# Patient Record
Sex: FEMALE | Race: WHITE | NOT HISPANIC OR LATINO | Employment: FULL TIME | URBAN - NONMETROPOLITAN AREA
[De-identification: names, ages, dates, MRNs, and addresses within clinical notes are randomized per-mention and may not be internally consistent; named-entity substitution may affect disease eponyms.]

---

## 2020-04-13 ENCOUNTER — TELEMEDICINE (OUTPATIENT)
Dept: SURGERY | Facility: CLINIC | Age: 40
End: 2020-04-13

## 2020-04-13 DIAGNOSIS — Z86.010 HISTORY OF COLON POLYPS: ICD-10-CM

## 2020-04-13 DIAGNOSIS — R13.19 OTHER DYSPHAGIA: Primary | ICD-10-CM

## 2020-04-13 DIAGNOSIS — Z72.0 TOBACCO ABUSE: ICD-10-CM

## 2020-04-13 DIAGNOSIS — Z12.11 COLON CANCER SCREENING: ICD-10-CM

## 2020-04-13 PROCEDURE — 99213 OFFICE O/P EST LOW 20 MIN: CPT | Performed by: SURGERY

## 2020-04-13 NOTE — PROGRESS NOTES
General Surgery      Patient Care Team:  Ruth Aguilar as PCP - General (Nurse Practitioner)    CHIEF COMPLAINT: dysphagia    HISTORY OF PRESENT ILLNESS:    This very pleasant patient is having issues with swallowing.  She feels like the issue is in the middle of her throat.  She had a right thyroidectomy in 2008.  She has had ultrasounds since that time and has had no further issues.  She also has a history of colon polyps.  Her last colonoscopy was 4 years ago.  She has no issues with abdominal pain or constipation.  Her only complaint is difficulty swallowing.  She feels like food or liquid will not go down and that she will choke.  She has no masses in her neck but when she pushes on her surgical scar she can feel it higher in her throat.  She does smoke cigarettes.  She takes Flonase occasionally but takes no other medications.  She has no other complaints.      Past Medical History:   Diagnosis Date   • Back pain    • Constipation    • Hypothyroidism      Past Surgical History:   Procedure Laterality Date   • THYROIDECTOMY      R   • WISDOM TOOTH EXTRACTION       Family History   Problem Relation Age of Onset   • Stomach cancer Maternal Grandmother    • Skin cancer Maternal Grandmother    • Throat cancer Maternal Grandfather    • Lung cancer Maternal Grandfather      Social History     Tobacco Use   • Smoking status: Current Every Day Smoker     Types: Cigarettes   • Smokeless tobacco: Never Used   Substance Use Topics   • Alcohol use: Yes     Comment: RARELY   • Drug use: Never       (Not in a hospital admission)  Allergies:  Patient has no known allergies.    REVIEW OF SYSTEMS:  Please see the above history of present illness for pertinent positives and negatives.  The remainder of the patient's systems have been reviewed and are negative.     Vital Signs  BP: ()/()   Arterial Line BP: ()/()          Physical Exam:  Physical Exam   Constitutional: Patient appears well-developed and well-nourished and in no  acute distress   HEENT:   Head: Normocephalic and atraumatic.   Mouth and Throat: Patient has moist mucous membranes. Oropharynx is clear of any erythema or exudate.     Neck:  No JVD present. No thyromegaly present.   Musculoskeletal: Normal posture.  Neurological: Patient is alert and oriented.  Psychological:   Mood and behavior appropriate.  Skin: Skin is warm and dry.    Debilities/Disabilities Identified: None    Emotional Behavior: Appropriate           Results Review:    I reviewed the patient's new clinical results.  Lab Results (most recent)     None          Imaging Results (Most Recent)     None            ECG/EMG Results (most recent)     None            Assessment/Plan     Dysphagia  History of colon polyps  Colon cancer screening    --we will get Randall scheduled for a swallow study.  We will also get her scheduled for an EGD and colonoscopy.     I discussed with the patient the benefits and risks of performing endoscopy.  Benefits and risks were not limited to but including bleeding, infection, perforation, complications of anesthesia, aspiration.  The patient appeared to understand and is willing to proceed.    Thank you for allowing me to participate in the care of this interesting patient.    Karlee Rao DO  04/13/20  10:16

## 2020-04-13 NOTE — PROGRESS NOTES
This note is a blank note.  Randall had 2 visits scheduled at the same time due to issues connecting to tele-health.

## 2020-04-16 ENCOUNTER — HOSPITAL ENCOUNTER (OUTPATIENT)
Dept: GENERAL RADIOLOGY | Facility: HOSPITAL | Age: 40
Discharge: HOME | End: 2020-04-16
Admitting: SURGERY

## 2020-04-16 DIAGNOSIS — R13.19 OTHER DYSPHAGIA: ICD-10-CM

## 2020-04-16 DIAGNOSIS — R13.19 OTHER DYSPHAGIA: Primary | ICD-10-CM

## 2020-04-16 PROCEDURE — 92611 MOTION FLUOROSCOPY/SWALLOW: CPT

## 2020-04-16 PROCEDURE — 74230 X-RAY XM SWLNG FUNCJ C+: CPT

## 2020-04-16 RX ADMIN — BARIUM SULFATE 183 ML: 960 POWDER, FOR SUSPENSION ORAL at 08:45

## 2020-04-16 NOTE — MBS/VFSS/FEES
Outpatient Speech Language Pathology   Adult Swallow Initial Evaluation   Modified Barium Swallow Study  HAYDEE Rivera     Patient Name: Randall Tang  : 1980  MRN: [id number]  Today's Date: 2020         Visit Date: 2020   There is no problem list on file for this patient.       Past Medical History:   Diagnosis Date   • Back pain    • Constipation    • Hypothyroidism         Past Surgical History:   Procedure Laterality Date   • THYROIDECTOMY      R   • WISDOM TOOTH EXTRACTION           Visit Dx:     ICD-10-CM ICD-9-CM   1. Other dysphagia R13.19 787.29           SLP Adult Swallow Evaluation     Row Name 20 0923       Rehab Evaluation    Document Type  evaluation  -AD    Total Evaluation Minutes, SLP  53 total time for set up, procedure and interpretation  -AD    Subjective Information  complains of difficulty swallowing and globus sensation  -AD    Patient Observations  alert;cooperative;agree to therapy  -AD    Patient/Family Observations  Pt seen for MBS standing in both the upright lateral and AP views.  -AD    Patient Effort  good  -AD    Symptoms Noted During/After Treatment  none  -AD       General Information    Patient Profile Reviewed  yes  -AD    Pertinent History Of Current Problem  Pt reports at least a 2 month history of dysphagia with a globus sensation when eating and choking at times on both solids and liquids. Pt reports that it has gradually worsened over the last 2 months as well. She staes that when this happens, she can breath in through her nose, but cannnot blow air out of her mouth. She further reports having to regurgitate foods back into the oral cavity when they stick and she can't get it to go down. Hx of remote right thyroidectomy .   -AD    Current Method of Nutrition  regular textures;thin liquids  -AD    Precautions/Limitations, Vision  WFL  -AD    Precautions/Limitations, Hearing  WFL  -AD    Prior Level of Function-Communication  WFL  -AD    Prior  Level of Function-Swallowing  no diet consistency restrictions  -AD    Plans/Goals Discussed with  patient;agreed upon  -AD    Barriers to Rehab  none identified  -AD    Patient's Goals for Discharge  eat/drink without coughing/choking to eat/drink without globus sensation  -AD       Pain Assessment    Additional Documentation  -- no pain reported or indicated during this procedure  -AD       Oral Motor and Function    Oral Lesions or Structural Abnormalities and/or variants  none   -AD    Dentition Assessment  natural, present and adequate  -AD    Secretion Management  WNL/WFL  -AD    Mucosal Quality  moist, healthy  -AD    Volitional Swallow  WFL  -AD    Volitional Cough  WFL  -AD       Oral Musculature and Cranial Nerve Assessment    Oral Motor General Assessment  WFL  -AD       General Eating/Swallowing Observations    Respiratory Support Currently in Use  room air  -AD    Eating/Swallowing Skills  self-fed;fed by SLP;appropriate self-feeding skills observed  -AD    Positioning During Eating  upright 90 degree standing, left lateral and AP views  -AD       Respiratory    Respiratory Status  WFL;room air;during swallowing/eating  -AD       MBS/VFSS    Utensils Used  spoon;cup;straw  -AD    Consistencies Trialed  regular textures;pureed;thin liquids;nectar/syrup-thick liquids;honey-thick liquids  -AD       MBS/VFSS Interpretation    Oral Prep Phase  WFL  -AD    Oral Transit Phase  WFL  -AD    Oral Residue  WFL  -AD    VFSS Summary  Pt presents with a functional oropharyngeal swallow w/no significant oral or pharyngeal residue, normal bolus prep and control as well as oral and pharyngeal transit. No penetration or aspiration viewed. Some asymmetry of the bolus during the AP esophageal sweep noted w/deviation to the left at approximately C5. See image attached and comments below in the Esophageal Phase.   -AD    Oral Phase, Comment  No deficits of the oral phase noted. Spontaneous subsequent swallow noted on cup and  straw drinks of thins.   -AD       Initiation of Pharyngeal Swallow    Initiation of Pharyngeal Swallow  WFL  -AD    Pharyngeal Phase  functional pharyngeal phase of swallowing  -AD    Pharyngeal Phase, Comment  No deficits of the pharyngeal phase of swallowing noted.   -AD       Esophageal Phase    Esophageal Phase  other (see comments)  -AD    Esophageal Phase, Comment  Pt noted to have asymmetric transit of the bolus on thins during esophageal sweep in the AP view w/deviation of the bolus to the left at around the level of C5. Image is attached. This does correlate to the patient's description of the bolus feeling as if it is moving down one side of her throat. Further imaging of the neck may be helpful in further assessment.   -AD       Clinical Impression    SLP Swallowing Diagnosis  functional oral phase;functional pharyngeal phase;other (see comments) See comments under Esophageal Phase.   -AD    Functional Impact  no impact on function  -AD    Swallow Criteria for Skilled Therapeutic Interventions Met  no problems identified which require skilled intervention  -AD       Recommendations    Therapy Frequency (Swallow)  evaluation only  -AD    SLP Diet Recommendation  regular textures;thin liquids  -AD    Recommended Precautions and Strategies  upright posture during/after eating  -AD    SLP Rec. for Method of Medication Administration  meds whole;with thin liquids;as tolerated consider cutting large pills in half  -AD    Monitor for Signs of Aspiration  no  -AD    Anticipated Dischage Disposition  home  -AD    Demonstrates Need for Referral to Another Service  other (see comments) consider further images of the neck  -AD      User Key  (r) = Recorded By, (t) = Taken By, (c) = Cosigned By    Initials Name Provider Type    Scarlett Silveira MS CCC-SLP Speech and Language Pathologist            AP view during esophageal sweep with asymmetric transit of the bolus to the left. All other images located in  PACS.      OP SLP Education     Row Name 04/16/20 0932       Education    Barriers to Learning  No barriers identified  -AD    Education Provided  Described results of evaluation;Patient expressed understanding of evaluation Asymmetric bolus not discussed w/pt as noted on review  -AD    Assessed  Learning needs;Learning motivation;Learning preferences;Learning readiness  -AD    Learning Motivation  Strong  -AD    Learning Method  Explanation;Demonstration  -AD    Teaching Response  Verbalized understanding;Demonstrated understanding  -AD    Education Comments  Images and verbal review of test provided. Did not discuss asymmetric view of bolus with patient as this was noted on review of images after the patient had left.   -AD      User Key  (r) = Recorded By, (t) = Taken By, (c) = Cosigned By    Initials Name Effective Dates    Scarlett Silveira MS CCC-SLP 02/28/19 -           OP SLP Assessment/Plan - 04/16/20 0932        SLP Assessment    Functional Problems  Swallowing   -AD    Impact on Function: Swallowing  Impact on social aspects of eating;Risk of malnourishment   -AD    Clinical Impression: Swallowing  WNL   -AD    Functional Problems Comment  Complaints of globus sensation and choking at times. Pt also with complaints of regurgitation at times.    -AD    Clinical Impression Comments  Functional oropharyngeal dysphagia   -AD    SLP Diagnosis  Functional oropharyngeal swallow   -AD    Patient would benefit from skilled therapy intervention  No   -AD       SLP Plan    Frequency  eval only   -AD    Plan Comments  Recommend further imaging of the neck.   -AD      User Key  (r) = Recorded By, (t) = Taken By, (c) = Cosigned By    Initials Name Provider Type    Scarlett Silveira MS CCC-SLP Speech and Language Pathologist           Time Calculation:   SLP Start Time: 0830  SLP Stop Time: 0923  SLP Time Calculation (min): 53 min  SLP Non-Billable Time (min): 0 min  Total Timed Code Minutes- SLP: 0  minute(s)    Therapy Charges for Today     Code Description Service Date Service Provider Modifiers Qty    [id number] HC ST MOTION FLUORO EVAL SWALLOW 4 4/16/2020 Scarlett Conti, MS CCC-SLP GN 1          Scarlett Conti, MS CCC-SLP  4/16/2020

## 2020-04-20 ENCOUNTER — APPOINTMENT (OUTPATIENT)
Dept: CT IMAGING | Facility: HOSPITAL | Age: 40
End: 2020-04-20

## 2020-04-20 ENCOUNTER — HOSPITAL ENCOUNTER (OUTPATIENT)
Dept: CT IMAGING | Facility: HOSPITAL | Age: 40
Discharge: HOME | End: 2020-04-20
Admitting: SURGERY

## 2020-04-20 DIAGNOSIS — R13.19 OTHER DYSPHAGIA: ICD-10-CM

## 2020-04-20 DIAGNOSIS — N63.20 LEFT BREAST MASS: Primary | ICD-10-CM

## 2020-04-20 PROCEDURE — 70491 CT SOFT TISSUE NECK W/DYE: CPT

## 2020-04-20 PROCEDURE — 25010000002 IOPAMIDOL 61 % SOLUTION: Performed by: SURGERY

## 2020-04-20 RX ADMIN — IOPAMIDOL 100 ML: 612 INJECTION, SOLUTION INTRAVENOUS at 09:25

## 2020-04-21 ENCOUNTER — HOSPITAL ENCOUNTER (OUTPATIENT)
Dept: ULTRASOUND IMAGING | Facility: HOSPITAL | Age: 40
Discharge: HOME | End: 2020-04-21

## 2020-04-21 ENCOUNTER — HOSPITAL ENCOUNTER (OUTPATIENT)
Dept: MAMMOGRAPHY | Facility: HOSPITAL | Age: 40
Discharge: HOME | End: 2020-04-21
Admitting: SURGERY

## 2020-04-21 DIAGNOSIS — N63.20 LEFT BREAST MASS: ICD-10-CM

## 2020-04-21 PROCEDURE — 77066 DX MAMMO INCL CAD BI: CPT

## 2020-04-21 PROCEDURE — G0279 TOMOSYNTHESIS, MAMMO: HCPCS

## 2020-04-21 PROCEDURE — 76641 ULTRASOUND BREAST COMPLETE: CPT

## 2020-04-22 ENCOUNTER — TELEMEDICINE (OUTPATIENT)
Dept: SURGERY | Facility: CLINIC | Age: 40
End: 2020-04-22

## 2020-04-22 DIAGNOSIS — R13.19 OTHER DYSPHAGIA: Primary | ICD-10-CM

## 2020-04-22 PROCEDURE — 99441 PR PHYS/QHP TELEPHONE EVALUATION 5-10 MIN: CPT | Performed by: SURGERY

## 2020-04-22 RX ORDER — OMEPRAZOLE 40 MG/1
40 CAPSULE, DELAYED RELEASE ORAL DAILY
Qty: 30 CAPSULE | Refills: 6 | Status: SHIPPED | OUTPATIENT
Start: 2020-04-22 | End: 2021-05-19

## 2020-04-22 NOTE — PROGRESS NOTES
Unable to complete visit using a video connection to the patient. A phone visit was used to complete this visits. Total time of discussion was 10 minutes.    Randall's swallow study and neck CT were negative except for a mass of the breast.  She underwent a mammogram and ultrasound which showed a probably cyst.  She is still having issues with swallowing.  She feels a full sensation in her throat.  We discussed beginning a PPI and seeing if that helped.  We will have her come and see us in 4 weeks to discuss an EGD.

## 2020-06-03 ENCOUNTER — TRANSCRIBE ORDERS (OUTPATIENT)
Dept: ADMINISTRATIVE | Facility: HOSPITAL | Age: 40
End: 2020-06-03

## 2020-06-03 ENCOUNTER — OFFICE VISIT (OUTPATIENT)
Dept: SURGERY | Facility: CLINIC | Age: 40
End: 2020-06-03

## 2020-06-03 VITALS
TEMPERATURE: 99.7 F | SYSTOLIC BLOOD PRESSURE: 120 MMHG | DIASTOLIC BLOOD PRESSURE: 80 MMHG | HEART RATE: 100 BPM | HEIGHT: 64 IN | RESPIRATION RATE: 20 BRPM | BODY MASS INDEX: 23.56 KG/M2 | WEIGHT: 138 LBS

## 2020-06-03 DIAGNOSIS — R14.0 BLOATING: ICD-10-CM

## 2020-06-03 DIAGNOSIS — Z01.818 OTHER SPECIFIED PRE-OPERATIVE EXAMINATION: Primary | ICD-10-CM

## 2020-06-03 DIAGNOSIS — R13.19 OTHER DYSPHAGIA: Primary | ICD-10-CM

## 2020-06-03 DIAGNOSIS — N63.0 BREAST MASS: ICD-10-CM

## 2020-06-03 PROCEDURE — 99214 OFFICE O/P EST MOD 30 MIN: CPT | Performed by: SURGERY

## 2020-06-03 NOTE — H&P
Randall Tang 40 y.o. female presents for FU diff swallowing and eval LEFT breast cyst.  Pt reports the sensation in her throat comes and goes.  She did note that while eating a particular food a few days ago she had an immediate feeling when the food touched the back of her throat - before she swallowed.  Pt also reports she has a stressful lifestyle and is currently dealing with a serious issue with her son.  Pt has not seen ENT and asks about possible food allergies.  Pt screens neg.  Pt, nurse, and doctor wearing masks.   Chief Complaint   Patient presents with   • Difficulty Swallowing   • Cyst     LEFT breast             HPI   Above-noted and agree.  Randall is still having issues with difficulty in swallowing.  The Prilosec has not helped.  She had a CT scan which was negative as well as a swallow study which was negative.  The CT scan revealed a mass of the left breast.  An ultrasound was done which read it is probably benign and recommended a six-month follow-up.  She cannot feel any lesion in her left breast but does have some tenderness on the lateral side of it.  She has no family history of breast cancer but she does have a strong family history of GI cancer.  She has some issues with nausea and always feels bloated.  She has never had an EGD before.  She has no chest pain or shortness of breath.  She does smoke daily.  She has no other complaints.      Review of Systems   All other systems reviewed and are negative.            Current Outpatient Medications:   •  fluticasone (FLONASE) 50 MCG/ACT nasal spray, , Disp: , Rfl:   •  omeprazole (priLOSEC) 40 MG capsule, Take 1 capsule by mouth Daily., Disp: 30 capsule, Rfl: 6        No Known Allergies        Past Medical History:   Diagnosis Date   • Back pain    • Constipation    • Hypothyroidism            Past Surgical History:   Procedure Laterality Date   • THYROIDECTOMY      R   • WISDOM TOOTH EXTRACTION             Social History     Tobacco Use   •  "Smoking status: Current Every Day Smoker     Types: Cigarettes   • Smokeless tobacco: Never Used   Substance Use Topics   • Alcohol use: Yes     Comment: RARELY   • Drug use: Never             There is no immunization history on file for this patient.        Physical Exam   Constitutional: She is oriented to person, place, and time. She appears well-developed and well-nourished.   HENT:   Head: Normocephalic and atraumatic.   Right Ear: External ear normal.   Left Ear: External ear normal.   Cardiovascular: Normal rate and regular rhythm.   Pulmonary/Chest: Effort normal and breath sounds normal. Right breast exhibits no inverted nipple, no nipple discharge, no skin change and no tenderness. Left breast exhibits tenderness. Left breast exhibits no inverted nipple, no mass, no nipple discharge and no skin change.   Abdominal: Soft. Bowel sounds are normal.   Musculoskeletal: She exhibits no edema or deformity.   Neurological: She is alert and oriented to person, place, and time.   Skin: Skin is warm and dry.   Psychiatric: She has a normal mood and affect. Her behavior is normal.   Nursing note and vitals reviewed.        Debilities/Disabilities Identified: None    Emotional Behavior: Appropriate      /80   Pulse 100   Temp 99.7 °F (37.6 °C)   Resp 20   Ht 162.6 cm (64\")   Wt 62.6 kg (138 lb)   BMI 23.69 kg/m²          Randall was seen today for difficulty swallowing and cyst.    Diagnoses and all orders for this visit:    Other dysphagia    Bloating    Breast mass    We will repeat the ultrasound in 6 months.  We will also get Randall set up for an EGD.  I discussed with the patient the benefits and risks of performing endoscopy.  Benefits and risks were not limited to but including bleeding, infection, perforation, complications of anesthesia, aspiration.  The patient appeared to understand and is willing to proceed.    Thank you for allowing me to participate in the care of this interesting " patient.

## 2020-06-03 NOTE — PROGRESS NOTES
Randall Tang 40 y.o. female presents for FU diff swallowing and eval LEFT breast cyst.  Pt reports the sensation in her throat comes and goes.  She did note that while eating a particular food a few days ago she had an immediate feeling when the food touched the back of her throat - before she swallowed.  Pt also reports she has a stressful lifestyle and is currently dealing with a serious issue with her son.  Pt has not seen ENT and asks about possible food allergies.  Pt screens neg.  Pt, nurse, and doctor wearing masks.   Chief Complaint   Patient presents with   • Difficulty Swallowing   • Cyst     LEFT breast             HPI   Above-noted and agree.  Randall is still having issues with difficulty in swallowing.  The Prilosec has not helped.  She had a CT scan which was negative as well as a swallow study which was negative.  The CT scan revealed a mass of the left breast.  An ultrasound was done which read it is probably benign and recommended a six-month follow-up.  She cannot feel any lesion in her left breast but does have some tenderness on the lateral side of it.  She has no family history of breast cancer but she does have a strong family history of GI cancer.  She has some issues with nausea and always feels bloated.  She has never had an EGD before.  She has no chest pain or shortness of breath.  She does smoke daily.  She has no other complaints.      Review of Systems   All other systems reviewed and are negative.            Current Outpatient Medications:   •  fluticasone (FLONASE) 50 MCG/ACT nasal spray, , Disp: , Rfl:   •  omeprazole (priLOSEC) 40 MG capsule, Take 1 capsule by mouth Daily., Disp: 30 capsule, Rfl: 6        No Known Allergies        Past Medical History:   Diagnosis Date   • Back pain    • Constipation    • Hypothyroidism            Past Surgical History:   Procedure Laterality Date   • THYROIDECTOMY      R   • WISDOM TOOTH EXTRACTION             Social History     Tobacco Use   •  "Smoking status: Current Every Day Smoker     Types: Cigarettes   • Smokeless tobacco: Never Used   Substance Use Topics   • Alcohol use: Yes     Comment: RARELY   • Drug use: Never             There is no immunization history on file for this patient.        Physical Exam   Constitutional: She is oriented to person, place, and time. She appears well-developed and well-nourished.   HENT:   Head: Normocephalic and atraumatic.   Right Ear: External ear normal.   Left Ear: External ear normal.   Cardiovascular: Normal rate and regular rhythm.   Pulmonary/Chest: Effort normal and breath sounds normal. Right breast exhibits no inverted nipple, no nipple discharge, no skin change and no tenderness. Left breast exhibits tenderness. Left breast exhibits no inverted nipple, no mass, no nipple discharge and no skin change.   Abdominal: Soft. Bowel sounds are normal.   Musculoskeletal: She exhibits no edema or deformity.   Neurological: She is alert and oriented to person, place, and time.   Skin: Skin is warm and dry.   Psychiatric: She has a normal mood and affect. Her behavior is normal.   Nursing note and vitals reviewed.        Debilities/Disabilities Identified: None    Emotional Behavior: Appropriate      /80   Pulse 100   Temp 99.7 °F (37.6 °C)   Resp 20   Ht 162.6 cm (64\")   Wt 62.6 kg (138 lb)   BMI 23.69 kg/m²         Randall was seen today for difficulty swallowing and cyst.    Diagnoses and all orders for this visit:    Other dysphagia    Bloating    Breast mass    We will repeat the ultrasound in 6 months.  We will also get Randall set up for an EGD.  I discussed with the patient the benefits and risks of performing endoscopy.  Benefits and risks were not limited to but including bleeding, infection, perforation, complications of anesthesia, aspiration.  The patient appeared to understand and is willing to proceed.    Thank you for allowing me to participate in the care of this interesting patient.        "

## 2020-06-13 ENCOUNTER — LAB (OUTPATIENT)
Dept: LAB | Facility: HOSPITAL | Age: 40
End: 2020-06-13

## 2020-06-13 DIAGNOSIS — Z01.818 OTHER SPECIFIED PRE-OPERATIVE EXAMINATION: ICD-10-CM

## 2020-06-13 PROCEDURE — U0004 COV-19 TEST NON-CDC HGH THRU: HCPCS

## 2020-06-13 PROCEDURE — C9803 HOPD COVID-19 SPEC COLLECT: HCPCS

## 2020-06-13 PROCEDURE — U0002 COVID-19 LAB TEST NON-CDC: HCPCS

## 2020-06-14 LAB
REF LAB TEST METHOD: NORMAL
SARS-COV-2 RNA RESP QL NAA+PROBE: NOT DETECTED

## 2020-06-15 ENCOUNTER — ANESTHESIA EVENT (OUTPATIENT)
Dept: PERIOP | Facility: HOSPITAL | Age: 40
End: 2020-06-15

## 2020-06-16 ENCOUNTER — ANESTHESIA (OUTPATIENT)
Dept: PERIOP | Facility: HOSPITAL | Age: 40
End: 2020-06-16

## 2020-06-16 ENCOUNTER — HOSPITAL ENCOUNTER (OUTPATIENT)
Facility: HOSPITAL | Age: 40
Setting detail: HOSPITAL OUTPATIENT SURGERY
Discharge: HOME | End: 2020-06-16
Attending: SURGERY | Admitting: SURGERY

## 2020-06-16 VITALS
DIASTOLIC BLOOD PRESSURE: 67 MMHG | SYSTOLIC BLOOD PRESSURE: 102 MMHG | TEMPERATURE: 98.4 F | RESPIRATION RATE: 12 BRPM | OXYGEN SATURATION: 99 % | BODY MASS INDEX: 23.28 KG/M2 | HEART RATE: 74 BPM | WEIGHT: 135.6 LBS

## 2020-06-16 DIAGNOSIS — N63.0 BREAST MASS: ICD-10-CM

## 2020-06-16 DIAGNOSIS — R14.0 BLOATING: ICD-10-CM

## 2020-06-16 DIAGNOSIS — R13.19 OTHER DYSPHAGIA: ICD-10-CM

## 2020-06-16 PROCEDURE — 87081 CULTURE SCREEN ONLY: CPT | Performed by: SURGERY

## 2020-06-16 PROCEDURE — 43239 EGD BIOPSY SINGLE/MULTIPLE: CPT | Performed by: SURGERY

## 2020-06-16 PROCEDURE — 25010000002 PROPOFOL 10 MG/ML EMULSION: Performed by: NURSE ANESTHETIST, CERTIFIED REGISTERED

## 2020-06-16 PROCEDURE — 88305 TISSUE EXAM BY PATHOLOGIST: CPT | Performed by: SURGERY

## 2020-06-16 RX ORDER — LIDOCAINE HYDROCHLORIDE 10 MG/ML
0.5 INJECTION, SOLUTION EPIDURAL; INFILTRATION; INTRACAUDAL; PERINEURAL ONCE AS NEEDED
Status: DISCONTINUED | OUTPATIENT
Start: 2020-06-16 | End: 2020-06-16 | Stop reason: HOSPADM

## 2020-06-16 RX ORDER — SUCRALFATE 1 G/1
1 TABLET ORAL 4 TIMES DAILY
Qty: 120 TABLET | Refills: 1 | Status: SHIPPED | OUTPATIENT
Start: 2020-06-16 | End: 2021-05-19

## 2020-06-16 RX ORDER — ONDANSETRON 2 MG/ML
4 INJECTION INTRAMUSCULAR; INTRAVENOUS ONCE AS NEEDED
Status: DISCONTINUED | OUTPATIENT
Start: 2020-06-16 | End: 2020-06-16 | Stop reason: HOSPADM

## 2020-06-16 RX ORDER — LIDOCAINE HYDROCHLORIDE 20 MG/ML
INJECTION, SOLUTION INFILTRATION; PERINEURAL AS NEEDED
Status: DISCONTINUED | OUTPATIENT
Start: 2020-06-16 | End: 2020-06-16 | Stop reason: SURG

## 2020-06-16 RX ORDER — SODIUM CHLORIDE 0.9 % (FLUSH) 0.9 %
10 SYRINGE (ML) INJECTION EVERY 12 HOURS SCHEDULED
Status: DISCONTINUED | OUTPATIENT
Start: 2020-06-16 | End: 2020-06-16 | Stop reason: HOSPADM

## 2020-06-16 RX ORDER — SODIUM CHLORIDE 0.9 % (FLUSH) 0.9 %
10 SYRINGE (ML) INJECTION AS NEEDED
Status: DISCONTINUED | OUTPATIENT
Start: 2020-06-16 | End: 2020-06-16 | Stop reason: HOSPADM

## 2020-06-16 RX ORDER — PROPOFOL 10 MG/ML
VIAL (ML) INTRAVENOUS AS NEEDED
Status: DISCONTINUED | OUTPATIENT
Start: 2020-06-16 | End: 2020-06-16 | Stop reason: SURG

## 2020-06-16 RX ORDER — SODIUM CHLORIDE, SODIUM LACTATE, POTASSIUM CHLORIDE, CALCIUM CHLORIDE 600; 310; 30; 20 MG/100ML; MG/100ML; MG/100ML; MG/100ML
9 INJECTION, SOLUTION INTRAVENOUS CONTINUOUS
Status: DISCONTINUED | OUTPATIENT
Start: 2020-06-16 | End: 2020-06-16 | Stop reason: HOSPADM

## 2020-06-16 RX ORDER — SODIUM CHLORIDE 9 MG/ML
40 INJECTION, SOLUTION INTRAVENOUS AS NEEDED
Status: DISCONTINUED | OUTPATIENT
Start: 2020-06-16 | End: 2020-06-16 | Stop reason: HOSPADM

## 2020-06-16 RX ADMIN — PROPOFOL 50 MG: 10 INJECTION, EMULSION INTRAVENOUS at 11:14

## 2020-06-16 RX ADMIN — SODIUM CHLORIDE, POTASSIUM CHLORIDE, SODIUM LACTATE AND CALCIUM CHLORIDE 9 ML/HR: 600; 310; 30; 20 INJECTION, SOLUTION INTRAVENOUS at 10:38

## 2020-06-16 RX ADMIN — PROPOFOL 50 MG: 10 INJECTION, EMULSION INTRAVENOUS at 11:10

## 2020-06-16 RX ADMIN — LIDOCAINE HYDROCHLORIDE 100 MG: 20 INJECTION, SOLUTION INFILTRATION; PERINEURAL at 11:08

## 2020-06-16 RX ADMIN — PROPOFOL 50 MG: 10 INJECTION, EMULSION INTRAVENOUS at 11:08

## 2020-06-16 NOTE — INTERVAL H&P NOTE
H&P reviewed. The patient was examined and there are no changes to the H&P.       /81   Pulse 92   Temp 98.4 °F (36.9 °C) (Oral)   Resp 15   Wt 61.5 kg (135 lb 9.6 oz)   SpO2 98%   BMI 23.28 kg/m²

## 2020-06-16 NOTE — OP NOTE
EGD Procedure Note    Pre-operative Diagnosis: Dysphasia and bloating    Post-operative Diagnosis: Esophagitis, gastritis, duodenitis    Procedure:  EGD with biopsies with cold forceps    Surgeon: Jalen    Estimated Blood Loss: Minimal    Complications: None    Anesthetic: MAC     Indications: Dysphasia and bloating    Findings/Treatments:    Esophagitis-biopsy of GE junction with cold forceps  Gastritis and duodenitis-biopsy for H. pylori with cold forceps    Recommendations:  -Await pathology.      Technique:    After discussing the benefits and risks of an EGD, benefits and risks not limited to but including:  Bleeding, infection, perforation, aspiration; informed consent was signed.  The patient was taken into the endoscopy suite at [facility] and placed in the left lateral decubitus position.  MAC anesthesia was induced under appropriate monitoring.  Bite block was placed and the gastroscope was inserted thru such and advanced under direct vision to second portion of the duodenum.  A careful inspection was made as the gastroscope was withdrawn, including a retroflexed view of the proximal stomach; findings and interventions are described below.  If biopsies were taken, this was done with the cold biopsy forceps.    Karlee Rao D.O.

## 2020-06-16 NOTE — ANESTHESIA PREPROCEDURE EVALUATION
Anesthesia Evaluation     Patient summary reviewed and Nursing notes reviewed   no history of anesthetic complications:  NPO Solid Status: > 8 hours  NPO Liquid Status: > 8 hours           Airway   Mallampati: II  TM distance: >3 FB  Neck ROM: full  No difficulty expected  Dental - normal exam     Pulmonary - normal exam   (+) a smoker (15 pk yr hx) Current Abstained day of surgery,   Cardiovascular - negative cardio ROS and normal exam  Exercise tolerance: good (4-7 METS)        Neuro/Psych- negative ROS  GI/Hepatic/Renal/Endo    (+)  GERD,      ROS Comment: dysphagia    Musculoskeletal     (-) back pain  Abdominal  - normal exam   Substance History      OB/GYN          Other - negative ROS                       Anesthesia Plan    ASA 2     MAC       Anesthetic plan, all risks, benefits, and alternatives have been provided, discussed and informed consent has been obtained with: patient.  Use of blood products discussed with patient  Consented to blood products.

## 2020-06-16 NOTE — NURSING NOTE
Patient delivered to the Phase II bay after endoscopy procedure.  Nurse at bedside assisting pt while coughing, and trying to catch her breath.  Pt throat was spasm-ing. Ice obtained and patient regained her composure.  Pt able to verbalize that she was much better.  Nurse unable to apply all of the monitoring equipment until pt was more comfortable.

## 2020-06-17 LAB — UREASE TISS QL: POSITIVE

## 2020-06-17 NOTE — PROGRESS NOTES
Please have her hold the carafate, increase the prilosec to bid and call in the appropriate antibiotics

## 2020-06-18 LAB
CYTO UR: NORMAL
LAB AP CASE REPORT: NORMAL
PATH REPORT.FINAL DX SPEC: NORMAL
PATH REPORT.GROSS SPEC: NORMAL

## 2020-06-24 ENCOUNTER — OFFICE VISIT (OUTPATIENT)
Dept: SURGERY | Facility: CLINIC | Age: 40
End: 2020-06-24

## 2020-06-24 VITALS — TEMPERATURE: 99.2 F

## 2020-06-24 DIAGNOSIS — Z72.0 TOBACCO ABUSE: ICD-10-CM

## 2020-06-24 DIAGNOSIS — K29.70 HELICOBACTER PYLORI GASTRITIS: Primary | ICD-10-CM

## 2020-06-24 DIAGNOSIS — B96.81 HELICOBACTER PYLORI GASTRITIS: Primary | ICD-10-CM

## 2020-06-24 DIAGNOSIS — K27.9 PUD (PEPTIC ULCER DISEASE): ICD-10-CM

## 2020-06-24 PROCEDURE — 99213 OFFICE O/P EST LOW 20 MIN: CPT | Performed by: SURGERY

## 2020-06-24 NOTE — PROGRESS NOTES
Randall Tang 40 y.o. female presents for PO FU EGD/+HPylori.  Discussed Rx with pt and she verbalizes understanding.  Pt will hold Sucralfate, however, pt would like Carafate suspension ordered  Due to diff swallowing the large pills.       HPI   Above noted and agree.  Randall had esophagitis, gastritis, and duodenitis.  She was H. Pylori positive.  She smokes cigarettes.  She has trouble swallowing large pills.  She has no fevers or chills.  She has no chest pain or shortness of breath.        Review of Systems        Past Medical History:   Diagnosis Date   • Back pain    • Constipation    • GERD (gastroesophageal reflux disease)    • Hypothyroidism            Past Surgical History:   Procedure Laterality Date   • ENDOMETRIAL ABLATION     • ENDOSCOPY N/A 6/16/2020    Procedure: Esophagogastroduodenoscopy with possible biopsy, polypectomy, dilation;  Surgeon: Karlee Rao DO;  Location: [facility];  Service: Gastroenterology;  Laterality: N/A;  LYUBOV TEST   GE JUNCTION BIOPSY  GASTRITIS  DUODENITIS  ESOPHAGITIS   • THYROIDECTOMY      R   • WISDOM TOOTH EXTRACTION             Physical Exam   Constitutional: She is oriented to person, place, and time. She appears well-developed and well-nourished.   Musculoskeletal: She exhibits no edema or deformity.   Neurological: She is alert and oriented to person, place, and time.   Skin: Skin is warm and dry.   Psychiatric: She has a normal mood and affect. Her behavior is normal.           Temp 99.2 °F (37.3 °C)         Randall was seen today for post-op follow-up.    Diagnoses and all orders for this visit:    Helicobacter pylori gastritis    PUD (peptic ulcer disease)    We discussed H. Pylori treatment and tobacco cessation.  We will bring her back in one month.    Thank you for allowing me to participate in the care of this interesting patient.

## 2020-10-09 ENCOUNTER — TELEMEDICINE (OUTPATIENT)
Dept: FAMILY MEDICINE CLINIC | Facility: TELEHEALTH | Age: 40
End: 2020-10-09

## 2020-10-09 VITALS — BODY MASS INDEX: 23.05 KG/M2 | HEIGHT: 64 IN | WEIGHT: 135 LBS

## 2020-10-09 DIAGNOSIS — R39.89 SUSPECTED UTI: Primary | ICD-10-CM

## 2020-10-09 PROCEDURE — 99213 OFFICE O/P EST LOW 20 MIN: CPT | Performed by: NURSE PRACTITIONER

## 2020-10-09 RX ORDER — NITROFURANTOIN 25; 75 MG/1; MG/1
100 CAPSULE ORAL 2 TIMES DAILY
Qty: 14 CAPSULE | Refills: 0 | Status: SHIPPED | OUTPATIENT
Start: 2020-10-09 | End: 2021-03-15

## 2020-10-09 RX ORDER — PHENAZOPYRIDINE HYDROCHLORIDE 100 MG/1
100 TABLET, FILM COATED ORAL 3 TIMES DAILY PRN
Qty: 6 TABLET | Refills: 0 | Status: SHIPPED | OUTPATIENT
Start: 2020-10-09 | End: 2021-03-15

## 2020-10-09 NOTE — PATIENT INSTRUCTIONS
Acute Urinary Retention, Female    Acute urinary retention means that you cannot pee (urinate) at all, or that you pee too little and your bladder is not emptied completely. If it is not treated, it can lead to kidney damage or other serious problems.  Follow these instructions at home:  · Take over-the-counter and prescription medicines only as told by your doctor. Ask your doctor what medicines you should stay away from. Do not take any medicine unless your doctor says it is okay to do so.  · If you were sent home with a tube that drains pee from the bladder (catheter), take care of it as told by your doctor.  · Drink enough fluid to keep your pee clear or pale yellow.  · If you were given an antibiotic, take it as told by your doctor. Do not stop taking the antibiotic even if you start to feel better.  · Do not use any products that contain nicotine or tobacco, such as cigarettes and e-cigarettes. If you need help quitting, ask your doctor.  · Watch for changes in your symptoms. Tell your doctor about them.  · If told, keep track of any changes in your blood pressure at home. Tell your doctor about them.  · Keep all follow-up visits as told by your doctor. This is important.  Contact a doctor if:  · You have spasms or you leak pee when you have spasms.  Get help right away if:  · You have chills or a fever.  · You have blood in your pee.  · You have a tube that drains the bladder and:  ? The tube stops draining pee.  ? The tube falls out.  Summary  · Acute urinary retention means that you cannot pee at all, or that you pee too little and your bladder is not emptied completely. If it is not treated, it can result in kidney damage or other serious problems.  · If you were sent home with a tube that drains pee from the bladder, take care of it as told by your doctor.  · Pay attention to any changes in your symptoms. Tell your doctor about them.  This information is not intended to replace advice given to you by your  health care provider. Make sure you discuss any questions you have with your health care provider.  Document Released: 06/05/2009 Document Revised: 11/30/2018 Document Reviewed: 01/19/2018  Elsevier Patient Education © 2020 [employer] Inc[employer]

## 2020-10-09 NOTE — PROGRESS NOTES
"CHIEF COMPLAINT  No chief complaint on file.        HPI  Randall Tang is a 40 y.o. female who presents with complaint of burning with urination, frequency and urgency starting today. She denies back pain or tenderness, fever or blood in urine. She is not using anything for symptom relief.     Review of Systems   Constitutional: Negative.    HENT: Negative.    Respiratory: Negative.    Cardiovascular: Negative.    Gastrointestinal: Negative for abdominal pain, constipation, diarrhea, nausea and vomiting.   Genitourinary: Positive for decreased urine volume, difficulty urinating, dysuria, frequency and urgency. Negative for flank pain.   Musculoskeletal: Negative for back pain.       Past Medical History:   Diagnosis Date   • Back pain    • Constipation    • GERD (gastroesophageal reflux disease)    • Hypothyroidism        Family History   Problem Relation Age of Onset   • Stomach cancer Maternal Grandmother    • Skin cancer Maternal Grandmother    • Throat cancer Maternal Grandfather    • Lung cancer Maternal Grandfather    • Breast cancer Neg Hx        Social History     Socioeconomic History   • Marital status:      Spouse name: Not on file   • Number of children: Not on file   • Years of education: Not on file   • Highest education level: Not on file   Tobacco Use   • Smoking status: Current Every Day Smoker     Packs/day: 0.50     Years: 15.00     Pack years: 7.50     Types: Cigarettes   • Smokeless tobacco: Never Used   Substance and Sexual Activity   • Alcohol use: Yes     Comment: RARELY   • Drug use: Never   • Sexual activity: Defer         Ht 162.6 cm (64\")   Wt 61.2 kg (135 lb)   BMI 23.17 kg/m²     PHYSICAL EXAM  Physical Exam   Constitutional: She is oriented to person, place, and time. She appears well-developed and well-nourished. She does not have a sickly appearance. She does not appear ill. No distress.   Pulmonary/Chest: Effort normal.  No respiratory distress.  Abdominal: There is no " abdominal tenderness.   Neurological: She is alert and oriented to person, place, and time.   Psychiatric: She has a normal mood and affect.   Vitals reviewed.      Results for orders placed or performed during the hospital encounter of 06/16/20   Urease For H Pylori - Tissue, Stomach    Specimen: Stomach; Tissue   Result Value Ref Range    Urease Positive (A) Negative   Tissue Pathology Exam    Specimen: Gastric, Body; Tissue   Result Value Ref Range    Case Report       Surgical Pathology Report                         Case: [id number]                                  Authorizing Provider:  Karlee Rao DO    Collected:           06/16/2020 11:13 AM          Ordering Location:     [facility]   Received:            06/16/2020 02:04 PM                                 OR                                                                           Pathologist:           Amauri Grace MD                                                         Specimen:    Gastric, Body, G E JUNCTION BIOPSY                                                         Final Diagnosis       1.  GE Junction Biopsy:  Benign squamous and glandular mucosa with     A.  Mild chronic inflammation without significant eosinophilia.    B.  No intestinal metaplasia identified by routine staining.    C.  No dysplasia nor malignancy identified.    jat/bitab       Gross Description       1.  The specimen is received in formalin labeled with the patient's name and further designated 'ge junction biopsy' is a small fragment of gray-tan tissue. The specimen is submitted for embedding as received.          Microscopic Description       Microscopic performed, incorporated in diagnosis.          Diagnoses and all orders for this visit:    Suspected UTI  -     nitrofurantoin, macrocrystal-monohydrate, (Macrobid) 100 MG capsule; Take 1 capsule by mouth 2 (Two) Times a Day.  -     phenazopyridine (Pyridium) 100 MG tablet; Take 1 tablet by mouth  3 (Three) Times a Day As Needed for Bladder Spasms.    -Macrobid as prescribed - complete entire course of medication even if you begin to feel better.   --Phenazopyridine is for painful urination and bladder spasms--this medication with cause urine to become bright orange and can stain undergarments.    -Continue to increase your fluid intake.   -Abstain from intercourse during antibiotic treatment.   -Practice good perineal hygiene: wipe front to back  -Do not hold your urine- go to the bathroom every 2-3 hours.     -Warning signs: severe abdominal/pelvic/back pain, fever >101, blood in urine - seek medical attention as soon as possible for a hands on/objective exam and possible labs.     -Follow up with your PCP in 2 days if no improvement in symptoms or if symptoms begin to worsen.     **if at any time experiences fever AND/OR worsening cough AND/OR shortness of breath AND/OR loss of sense of taste or smell, has been advised to go to nearest urgent care or emergency department for evaluation AND/OR testing    **if no improvement, but not worsening, may schedule a f/u virtual visit or E-visit      FOLLOW-UP  As discussed during visit with PCP/Virtual Care if no improvement or Urgent Care/Emergency Department if worsening of symptoms    Patient verbalizes understanding of medication dosage, comfort measures, instructions for treatment and follow-up.    Gayle Quintero, KEYANA  10/09/2020  10:57 EDT    This visit was performed via Telehealth.  This patient has been instructed to follow-up with their primary care provider if their symptoms worsen or the treatment provided does not resolve their illness.

## 2020-12-21 ENCOUNTER — E-VISIT (OUTPATIENT)
Dept: FAMILY MEDICINE CLINIC | Facility: TELEHEALTH | Age: 40
End: 2020-12-21

## 2020-12-21 DIAGNOSIS — J01.40 ACUTE PANSINUSITIS, RECURRENCE NOT SPECIFIED: Primary | ICD-10-CM

## 2020-12-21 PROCEDURE — 99422 OL DIG E/M SVC 11-20 MIN: CPT | Performed by: NURSE PRACTITIONER

## 2020-12-21 RX ORDER — AMOXICILLIN AND CLAVULANATE POTASSIUM 875; 125 MG/1; MG/1
1 TABLET, FILM COATED ORAL 2 TIMES DAILY
Qty: 20 TABLET | Refills: 0 | Status: SHIPPED | OUTPATIENT
Start: 2020-12-21 | End: 2020-12-31

## 2020-12-21 RX ORDER — FLUTICASONE PROPIONATE 50 MCG
2 SPRAY, SUSPENSION (ML) NASAL DAILY
Qty: 1 BOTTLE | Refills: 0 | Status: SHIPPED | OUTPATIENT
Start: 2020-12-21

## 2020-12-21 NOTE — PROGRESS NOTES
Randall Tang    1980  [id number]    I have reviewed the e-Visit questionnaire and patient's answers, my assessment and plan are as follows:      HPI  Randall Tang is a 40 y.o. with a >2 week history of nasal congestion, facial pain/pressure that worsens when pressing around eyes and cheeks, swollen glands, headache, ear pain, sneezing.  She denies fever, cough, shortness of breath, loss of taste/smell, body aches.  She has been taking decongestants for her symptoms    Review of Systems - Negative except symptoms listed in HPI      Diagnoses and all orders for this visit:    1. Acute pansinusitis, recurrence not specified (Primary)  -     amoxicillin-clavulanate (AUGMENTIN) 875-125 MG per tablet; Take 1 tablet by mouth 2 (Two) Times a Day for 10 days.  Dispense: 20 tablet; Refill: 0  -     fluticasone (FLONASE) 50 MCG/ACT nasal spray; 2 sprays into the nostril(s) as directed by provider Daily.  Dispense: 1 bottle; Refill: 0  --complete Augmentin as prescribed for infection  --start Flonase daily until symptoms resolve then use as needed  --may continue decongestants  --increase intake of clear, decaffeinated fluids to thin secretions/maintain hydration  --sinus rinses to clear sinuses   --ibuprofen or tylenol for pain  --if no improvement in 5-7 days, will need follow-up for further evaluation        Any medications prescribed have been sent electronically to   [place], [place] - [address] - [phone]  - [phone] FX  [address] [place] [address]  Phone: [phone] Fax: [phone]    [place], [place] - [address] [facility] [address] - [phone]  - [phone]   [address] [facility] [address]  [place] [id number]  Phone: [phone] Fax: [phone]      Time Documentation  Counseled patient  Counseling topics: diagnosis, treatment options and return instructions  Total encounter time: counseling time more than 50% of visit: 20 minutes        Jazlyn Renner,  KEYANA  12/21/20  08:32 EST

## 2020-12-21 NOTE — PATIENT INSTRUCTIONS
Sinusitis, Adult  Sinusitis is inflammation of your sinuses. Sinuses are hollow spaces in the bones around your face. Your sinuses are located:  · Around your eyes.  · In the middle of your forehead.  · Behind your nose.  · In your cheekbones.  Mucus normally drains out of your sinuses. When your nasal tissues become inflamed or swollen, mucus can become trapped or blocked. This allows bacteria, viruses, and fungi to grow, which leads to infection. Most infections of the sinuses are caused by a virus.  Sinusitis can develop quickly. It can last for up to 4 weeks (acute) or for more than 12 weeks (chronic). Sinusitis often develops after a cold.  What are the causes?  This condition is caused by anything that creates swelling in the sinuses or stops mucus from draining. This includes:  · Allergies.  · Asthma.  · Infection from bacteria or viruses.  · Deformities or blockages in your nose or sinuses.  · Abnormal growths in the nose (nasal polyps).  · Pollutants, such as chemicals or irritants in the air.  · Infection from fungi (rare).  What increases the risk?  You are more likely to develop this condition if you:  · Have a weak body defense system (immune system).  · Do a lot of swimming or diving.  · Overuse nasal sprays.  · Smoke.  What are the signs or symptoms?  The main symptoms of this condition are pain and a feeling of pressure around the affected sinuses. Other symptoms include:  · Stuffy nose or congestion.  · Thick drainage from your nose.  · Swelling and warmth over the affected sinuses.  · Headache.  · Upper toothache.  · A cough that may get worse at night.  · Extra mucus that collects in the throat or the back of the nose (postnasal drip).  · Decreased sense of smell and taste.  · Fatigue.  · A fever.  · Sore throat.  · Bad breath.  How is this diagnosed?  This condition is diagnosed based on:  · Your symptoms.  · Your medical history.  · A physical exam.  · Tests to find out if your condition is  acute or chronic. This may include:  ? Checking your nose for nasal polyps.  ? Viewing your sinuses using a device that has a light (endoscope).  ? Testing for allergies or bacteria.  ? Imaging tests, such as an MRI or CT scan.  In rare cases, a bone biopsy may be done to rule out more serious types of fungal sinus disease.  How is this treated?  Treatment for sinusitis depends on the cause and whether your condition is chronic or acute.  · If caused by a virus, your symptoms should go away on their own within 10 days. You may be given medicines to relieve symptoms. They include:  ? Medicines that shrink swollen nasal passages (topical intranasal decongestants).  ? Medicines that treat allergies (antihistamines).  ? A spray that eases inflammation of the nostrils (topical intranasal corticosteroids).  ? Rinses that help get rid of thick mucus in your nose (nasal saline washes).  · If caused by bacteria, your health care provider may recommend waiting to see if your symptoms improve. Most bacterial infections will get better without antibiotic medicine. You may be given antibiotics if you have:  ? A severe infection.  ? A weak immune system.  · If caused by narrow nasal passages or nasal polyps, you may need to have surgery.  Follow these instructions at home:  Medicines  · Take, use, or apply over-the-counter and prescription medicines only as told by your health care provider. These may include nasal sprays.  · If you were prescribed an antibiotic medicine, take it as told by your health care provider. Do not stop taking the antibiotic even if you start to feel better.  Hydrate and humidify    · Drink enough fluid to keep your urine pale yellow. Staying hydrated will help to thin your mucus.  · Use a cool mist humidifier to keep the humidity level in your home above 50%.  · Inhale steam for 10-15 minutes, 3-4 times a day, or as told by your health care provider. You can do this in the bathroom while a hot shower is  running.  · Limit your exposure to cool or dry air.  Rest  · Rest as much as possible.  · Sleep with your head raised (elevated).  · Make sure you get enough sleep each night.  General instructions    · Apply a warm, moist washcloth to your face 3-4 times a day or as told by your health care provider. This will help with discomfort.  · Wash your hands often with soap and water to reduce your exposure to germs. If soap and water are not available, use hand .  · Do not smoke. Avoid being around people who are smoking (secondhand smoke).  · Keep all follow-up visits as told by your health care provider. This is important.  Contact a health care provider if:  · You have a fever.  · Your symptoms get worse.  · Your symptoms do not improve within 10 days.  Get help right away if:  · You have a severe headache.  · You have persistent vomiting.  · You have severe pain or swelling around your face or eyes.  · You have vision problems.  · You develop confusion.  · Your neck is stiff.  · You have trouble breathing.  Summary  · Sinusitis is soreness and inflammation of your sinuses. Sinuses are hollow spaces in the bones around your face.  · This condition is caused by nasal tissues that become inflamed or swollen. The swelling traps or blocks the flow of mucus. This allows bacteria, viruses, and fungi to grow, which leads to infection.  · If you were prescribed an antibiotic medicine, take it as told by your health care provider. Do not stop taking the antibiotic even if you start to feel better.  · Keep all follow-up visits as told by your health care provider. This is important.  This information is not intended to replace advice given to you by your health care provider. Make sure you discuss any questions you have with your health care provider.  Document Revised: 05/20/2019 Document Reviewed: 05/20/2019  [employer] Patient Education © 2020 Elsevier Inc.

## 2021-03-15 ENCOUNTER — E-VISIT (OUTPATIENT)
Dept: FAMILY MEDICINE CLINIC | Facility: TELEHEALTH | Age: 41
End: 2021-03-15

## 2021-03-15 DIAGNOSIS — R39.89 SUSPECTED UTI: Primary | ICD-10-CM

## 2021-03-15 PROCEDURE — 99422 OL DIG E/M SVC 11-20 MIN: CPT | Performed by: NURSE PRACTITIONER

## 2021-03-15 RX ORDER — PHENAZOPYRIDINE HYDROCHLORIDE 200 MG/1
200 TABLET, FILM COATED ORAL 3 TIMES DAILY PRN
Qty: 6 TABLET | Refills: 0 | Status: SHIPPED | OUTPATIENT
Start: 2021-03-15 | End: 2021-03-17

## 2021-03-15 RX ORDER — NITROFURANTOIN 25; 75 MG/1; MG/1
100 CAPSULE ORAL 2 TIMES DAILY
Qty: 14 CAPSULE | Refills: 0 | Status: SHIPPED | OUTPATIENT
Start: 2021-03-15 | End: 2021-03-22

## 2021-03-15 NOTE — PROGRESS NOTES
Randall Tang    1980  [id number]    I have reviewed the e-Visit questionnaire and patient's answers, my assessment and plan are as follows:      HPI  Randall Tang is a 40 y.o. with a 1-4 day history of dysuria described as burning, frequency, urgency, bladder not feeling empty after urination, back pain.  She denies fever/chills, nausea/vomiting, belly pain, hematuria, vaginal symptoms.  Has had similar symptoms in the past which resolved with antibiotic.  Has also had kidney stones (removed by laser) in the past    Review of Systems - Negative except symptoms listed in HPI      Diagnoses and all orders for this visit:    1. Suspected UTI (Primary)  -     nitrofurantoin, macrocrystal-monohydrate, (MACROBID) 100 MG capsule; Take 1 capsule by mouth 2 (Two) Times a Day for 7 days.  Dispense: 14 capsule; Refill: 0  -     phenazopyridine (PYRIDIUM) 200 MG tablet; Take 1 tablet by mouth 3 (Three) Times a Day As Needed for Bladder Spasms for up to 2 days.  Dispense: 6 tablet; Refill: 0    -Macrobid as prescribed - complete entire course of medication even if you begin to feel better.   --Phenazopyridine is for painful urination and bladder spasms--this medication with cause urine to become bright orange and can stain undergarments.    -Continue to increase your fluid intake.   -Abstain from intercourse during antibiotic treatment.   -Practice good perineal hygiene: wipe front to back  -Do not hold your urine- go to the bathroom every 2-3 hours.     -Warning signs: severe abdominal/pelvic/back pain, fever >101, blood in urine - seek medical attention as soon as possible for a hands on/objective exam and possible labs.     -Follow up with your PCP in 2 days if no improvement in symptoms or if symptoms begin to worsen.       Any medications prescribed have been sent electronically to   [facility] - [place], [place] - [address] - [phone] [facility] [phone] FX [address] [place] [address]  Phone:  [phone] Fax: [phone]    [facility] - [place], [place] - [address] - [phone]  - [phone]   [address] [facility] [address]  [facility] [address]  Phone: [phone] Fax: [phone]      Time Documentation  Counseled patient  Counseling topics: diagnosis, treatment options and return instructions  Total encounter time: counseling time more than 50% of visit: 20 minutes        Jazlyn Renner, APRN  03/15/21  08:58 EDT

## 2021-03-15 NOTE — PATIENT INSTRUCTIONS
Urinary Tract Infection, Adult    A urinary tract infection (UTI) is an infection of any part of the urinary tract. The urinary tract includes the kidneys, ureters, bladder, and urethra. These organs make, store, and get rid of urine in the body.  Your health care provider may use other names to describe the infection. An upper UTI affects the ureters and kidneys (pyelonephritis). A lower UTI affects the bladder (cystitis) and urethra (urethritis).  What are the causes?  Most urinary tract infections are caused by bacteria in your genital area, around the entrance to your urinary tract (urethra). These bacteria grow and cause inflammation of your urinary tract.  What increases the risk?  You are more likely to develop this condition if:  · You have a urinary catheter that stays in place (indwelling).  · You are not able to control when you urinate or have a bowel movement (you have incontinence).  · You are female and you:  ? Use a spermicide or diaphragm for birth control.  ? Have low estrogen levels.  ? Are pregnant.  · You have certain genes that increase your risk (genetics).  · You are sexually active.  · You take antibiotic medicines.  · You have a condition that causes your flow of urine to slow down, such as:  ? An enlarged prostate, if you are male.  ? Blockage in your urethra (stricture).  ? A kidney stone.  ? A nerve condition that affects your bladder control (neurogenic bladder).  ? Not getting enough to drink, or not urinating often.  · You have certain medical conditions, such as:  ? Diabetes.  ? A weak disease-fighting system (immunesystem).  ? Sickle cell disease.  ? Gout.  ? Spinal cord injury.  What are the signs or symptoms?  Symptoms of this condition include:  · Needing to urinate right away (urgently).  · Frequent urination or passing small amounts of urine frequently.  · Pain or burning with urination.  · Blood in the urine.  · Urine that smells bad or unusual.  · Trouble urinating.  · Cloudy  urine.  · Vaginal discharge, if you are female.  · Pain in the abdomen or the lower back.  You may also have:  · Vomiting or a decreased appetite.  · Confusion.  · Irritability or tiredness.  · A fever.  · Diarrhea.  The first symptom in older adults may be confusion. In some cases, they may not have any symptoms until the infection has worsened.  How is this diagnosed?  This condition is diagnosed based on your medical history and a physical exam. You may also have other tests, including:  · Urine tests.  · Blood tests.  · Tests for sexually transmitted infections (STIs).  If you have had more than one UTI, a cystoscopy or imaging studies may be done to determine the cause of the infections.  How is this treated?  Treatment for this condition includes:  · Antibiotic medicine.  · Over-the-counter medicines to treat discomfort.  · Drinking enough water to stay hydrated.  If you have frequent infections or have other conditions such as a kidney stone, you may need to see a health care provider who specializes in the urinary tract (urologist).  In rare cases, urinary tract infections can cause sepsis. Sepsis is a life-threatening condition that occurs when the body responds to an infection. Sepsis is treated in the hospital with IV antibiotics, fluids, and other medicines.  Follow these instructions at home:    Medicines  · Take over-the-counter and prescription medicines only as told by your health care provider.  · If you were prescribed an antibiotic medicine, take it as told by your health care provider. Do not stop using the antibiotic even if you start to feel better.  General instructions  · Make sure you:  ? Empty your bladder often and completely. Do not hold urine for long periods of time.  ? Empty your bladder after sex.  ? Wipe from front to back after a bowel movement if you are female. Use each tissue one time when you wipe.  · Drink enough fluid to keep your urine pale yellow.  · Keep all follow-up  visits as told by your health care provider. This is important.  Contact a health care provider if:  · Your symptoms do not get better after 1-2 days.  · Your symptoms go away and then return.  Get help right away if you have:  · Severe pain in your back or your lower abdomen.  · A fever.  · Nausea or vomiting.  Summary  · A urinary tract infection (UTI) is an infection of any part of the urinary tract, which includes the kidneys, ureters, bladder, and urethra.  · Most urinary tract infections are caused by bacteria in your genital area, around the entrance to your urinary tract (urethra).  · Treatment for this condition often includes antibiotic medicines.  · If you were prescribed an antibiotic medicine, take it as told by your health care provider. Do not stop using the antibiotic even if you start to feel better.  · Keep all follow-up visits as told by your health care provider. This is important.  This information is not intended to replace advice given to you by your health care provider. Make sure you discuss any questions you have with your health care provider.  Document Revised: 12/05/2019 Document Reviewed: 06/27/2019  [employer] Patient Education © 2020 [employer]

## 2021-03-30 ENCOUNTER — E-VISIT (OUTPATIENT)
Dept: FAMILY MEDICINE CLINIC | Facility: TELEHEALTH | Age: 41
End: 2021-03-30

## 2021-03-30 DIAGNOSIS — R39.89 SUSPECTED UTI: Primary | ICD-10-CM

## 2021-03-30 PROCEDURE — 99422 OL DIG E/M SVC 11-20 MIN: CPT | Performed by: NURSE PRACTITIONER

## 2021-03-30 RX ORDER — SULFAMETHOXAZOLE AND TRIMETHOPRIM 800; 160 MG/1; MG/1
1 TABLET ORAL 2 TIMES DAILY
Qty: 14 TABLET | Refills: 0 | Status: SHIPPED | OUTPATIENT
Start: 2021-03-30 | End: 2021-04-06

## 2021-03-30 RX ORDER — PHENAZOPYRIDINE HYDROCHLORIDE 200 MG/1
200 TABLET, FILM COATED ORAL 3 TIMES DAILY PRN
Qty: 6 TABLET | Refills: 0 | Status: SHIPPED | OUTPATIENT
Start: 2021-03-30 | End: 2021-04-01

## 2021-03-30 NOTE — PROGRESS NOTES
Randall Tang    1980  [id number]    I have reviewed the e-Visit questionnaire and patient's answers, my assessment and plan are as follows:      HPI  Randall Tang is a 40 y.o. with a 1-4 day history of dysuria described as burning, bladder not feeling empty after urinating, difficulty passing urine, frequency, urgency.  She denies fever/chills, nausea/vomiting, belly/back pain, hematuria, vaginal symptoms.  She has had similar symptoms in the past which resolved with antibiotic treatment    Review of Systems - Negative except symptoms listed in [facility]      Diagnoses and all orders for this visit:    1. Suspected UTI (Primary)  -     sulfamethoxazole-trimethoprim (Bactrim DS) 800-160 MG per tablet; Take 1 tablet by mouth 2 (Two) Times a Day for 7 days.  Dispense: 14 tablet; Refill: 0  -     phenazopyridine (PYRIDIUM) 200 MG tablet; Take 1 tablet by mouth 3 (Three) Times a Day As Needed for Bladder Spasms for up to 2 days.  Dispense: 6 tablet; Refill: 0    --Bactrim DS as prescribed - complete entire course of medication even if you begin to feel better.   --Phenazopyridine is for painful urination and bladder spasms--this medication with cause urine to become bright orange and can stain undergarments.    -Continue to increase your fluid intake.   -Abstain from intercourse during antibiotic treatment.   -Practice good perineal hygiene: wipe front to back  -Do not hold your urine- go to the bathroom every 2-3 hours.     -Warning signs: severe abdominal/pelvic/back pain, fever >101, blood in urine - seek medical attention as soon as possible for a hands on/objective exam and possible labs.     -Follow up with your PCP in 2 days if no improvement in symptoms or if symptoms begin to worsen.       Any medications prescribed have been sent electronically to   [facility] - [place], [place] - [address] - [phone]  - [phone] [address] [place] [address]  Phone: [phone] Fax:  [phone]    [place], [place] - [address] - [phone]  - [phone]   [address] [facility] [address]  [facility] [address]  Phone: [phone] Fax: [phone]      Time Documentation  Counseled patient  Counseling topics: diagnosis, treatment options and return instructions  Total encounter time: counseling time more than 50% of visit: 20 minutes        KEYANA Jc  03/30/21  07:13 EDT

## 2021-03-30 NOTE — PATIENT INSTRUCTIONS
Urinary Tract Infection, Adult    A urinary tract infection (UTI) is an infection of any part of the urinary tract. The urinary tract includes the kidneys, ureters, bladder, and urethra. These organs make, store, and get rid of urine in the body.  Your health care provider may use other names to describe the infection. An upper UTI affects the ureters and kidneys (pyelonephritis). A lower UTI affects the bladder (cystitis) and urethra (urethritis).  What are the causes?  Most urinary tract infections are caused by bacteria in your genital area, around the entrance to your urinary tract (urethra). These bacteria grow and cause inflammation of your urinary tract.  What increases the risk?  You are more likely to develop this condition if:  · You have a urinary catheter that stays in place (indwelling).  · You are not able to control when you urinate or have a bowel movement (you have incontinence).  · You are female and you:  ? Use a spermicide or diaphragm for birth control.  ? Have low estrogen levels.  ? Are pregnant.  · You have certain genes that increase your risk (genetics).  · You are sexually active.  · You take antibiotic medicines.  · You have a condition that causes your flow of urine to slow down, such as:  ? An enlarged prostate, if you are male.  ? Blockage in your urethra (stricture).  ? A kidney stone.  ? A nerve condition that affects your bladder control (neurogenic bladder).  ? Not getting enough to drink, or not urinating often.  · You have certain medical conditions, such as:  ? Diabetes.  ? A weak disease-fighting system (immunesystem).  ? Sickle cell disease.  ? Gout.  ? Spinal cord injury.  What are the signs or symptoms?  Symptoms of this condition include:  · Needing to urinate right away (urgently).  · Frequent urination or passing small amounts of urine frequently.  · Pain or burning with urination.  · Blood in the urine.  · Urine that smells bad or unusual.  · Trouble urinating.  · Cloudy  urine.  · Vaginal discharge, if you are female.  · Pain in the abdomen or the lower back.  You may also have:  · Vomiting or a decreased appetite.  · Confusion.  · Irritability or tiredness.  · A fever.  · Diarrhea.  The first symptom in older adults may be confusion. In some cases, they may not have any symptoms until the infection has worsened.  How is this diagnosed?  This condition is diagnosed based on your medical history and a physical exam. You may also have other tests, including:  · Urine tests.  · Blood tests.  · Tests for sexually transmitted infections (STIs).  If you have had more than one UTI, a cystoscopy or imaging studies may be done to determine the cause of the infections.  How is this treated?  Treatment for this condition includes:  · Antibiotic medicine.  · Over-the-counter medicines to treat discomfort.  · Drinking enough water to stay hydrated.  If you have frequent infections or have other conditions such as a kidney stone, you may need to see a health care provider who specializes in the urinary tract (urologist).  In rare cases, urinary tract infections can cause sepsis. Sepsis is a life-threatening condition that occurs when the body responds to an infection. Sepsis is treated in the hospital with IV antibiotics, fluids, and other medicines.  Follow these instructions at home:    Medicines  · Take over-the-counter and prescription medicines only as told by your health care provider.  · If you were prescribed an antibiotic medicine, take it as told by your health care provider. Do not stop using the antibiotic even if you start to feel better.  General instructions  · Make sure you:  ? Empty your bladder often and completely. Do not hold urine for long periods of time.  ? Empty your bladder after sex.  ? Wipe from front to back after a bowel movement if you are female. Use each tissue one time when you wipe.  · Drink enough fluid to keep your urine pale yellow.  · Keep all follow-up  visits as told by your health care provider. This is important.  Contact a health care provider if:  · Your symptoms do not get better after 1-2 days.  · Your symptoms go away and then return.  Get help right away if you have:  · Severe pain in your back or your lower abdomen.  · A fever.  · Nausea or vomiting.  Summary  · A urinary tract infection (UTI) is an infection of any part of the urinary tract, which includes the kidneys, ureters, bladder, and urethra.  · Most urinary tract infections are caused by bacteria in your genital area, around the entrance to your urinary tract (urethra).  · Treatment for this condition often includes antibiotic medicines.  · If you were prescribed an antibiotic medicine, take it as told by your health care provider. Do not stop using the antibiotic even if you start to feel better.  · Keep all follow-up visits as told by your health care provider. This is important.  This information is not intended to replace advice given to you by your health care provider. Make sure you discuss any questions you have with your health care provider.  Document Revised: 12/05/2019 Document Reviewed: 06/27/2019  [employer] Patient Education © 2021 [employer]

## 2021-05-19 ENCOUNTER — E-VISIT (OUTPATIENT)
Dept: FAMILY MEDICINE CLINIC | Facility: TELEHEALTH | Age: 41
End: 2021-05-19

## 2021-05-19 DIAGNOSIS — J01.90 ACUTE NON-RECURRENT SINUSITIS, UNSPECIFIED LOCATION: Primary | ICD-10-CM

## 2021-05-19 DIAGNOSIS — J30.9 ALLERGIC RHINITIS, UNSPECIFIED SEASONALITY, UNSPECIFIED TRIGGER: ICD-10-CM

## 2021-05-19 PROBLEM — J38.00 VOCAL CORD PARALYSIS: Status: ACTIVE | Noted: 2021-03-05

## 2021-05-19 PROCEDURE — 99422 OL DIG E/M SVC 11-20 MIN: CPT | Performed by: NURSE PRACTITIONER

## 2021-05-19 RX ORDER — METHYLPREDNISOLONE 4 MG/1
TABLET ORAL
Qty: 21 TABLET | Refills: 0 | Status: SHIPPED | OUTPATIENT
Start: 2021-05-19

## 2021-05-19 RX ORDER — AMOXICILLIN AND CLAVULANATE POTASSIUM 875; 125 MG/1; MG/1
1 TABLET, FILM COATED ORAL 2 TIMES DAILY
Qty: 14 TABLET | Refills: 0 | Status: SHIPPED | OUTPATIENT
Start: 2021-05-19 | End: 2021-05-26

## 2021-05-19 NOTE — PATIENT INSTRUCTIONS
I have prescribed an antibiotic which will treat a sinus infection and a steroid which will help with sinus inflammation, cough and allergy symptoms.   Take medicine as prescribed.   Continue Flonase and over the counter combo cough medicine as needed.   If symptoms worsen or do not improve follow up with your PCP or visit your nearest Urgent Care Center or ER.        Sinusitis, Adult  Sinusitis is soreness and swelling (inflammation) of your sinuses. Sinuses are hollow spaces in the bones around your face. They are located:  · Around your eyes.  · In the middle of your forehead.  · Behind your nose.  · In your cheekbones.  Your sinuses and nasal passages are lined with a fluid called mucus. Mucus drains out of your sinuses. Swelling can trap mucus in your sinuses. This lets germs (bacteria, virus, or fungus) grow, which leads to infection. Most of the time, this condition is caused by a virus.  What are the causes?  This condition is caused by:  · Allergies.  · Asthma.  · Germs.  · Things that block your nose or sinuses.  · Growths in the nose (nasal polyps).  · Chemicals or irritants in the air.  · Fungus (rare).  What increases the risk?  You are more likely to develop this condition if:  · You have a weak body defense system (immune system).  · You do a lot of swimming or diving.  · You use nasal sprays too much.  · You smoke.  What are the signs or symptoms?  The main symptoms of this condition are pain and a feeling of pressure around the sinuses. Other symptoms include:  · Stuffy nose (congestion).  · Runny nose (drainage).  · Swelling and warmth in the sinuses.  · Headache.  · Toothache.  · A cough that may get worse at night.  · Mucus that collects in the throat or the back of the nose (postnasal drip).  · Being unable to smell and taste.  · Being very tired (fatigue).  · A fever.  · Sore throat.  · Bad breath.  How is this diagnosed?  This condition is diagnosed based on:  · Your symptoms.  · Your medical  history.  · A physical exam.  · Tests to find out if your condition is short-term (acute) or long-term (chronic). Your doctor may:  ? Check your nose for growths (polyps).  ? Check your sinuses using a tool that has a light (endoscope).  ? Check for allergies or germs.  ? Do imaging tests, such as an MRI or CT scan.  How is this treated?  Treatment for this condition depends on the cause and whether it is short-term or long-term.  · If caused by a virus, your symptoms should go away on their own within 10 days. You may be given medicines to relieve symptoms. They include:  ? Medicines that shrink swollen tissue in the nose.  ? Medicines that treat allergies (antihistamines).  ? A spray that treats swelling of the nostrils.   ? Rinses that help get rid of thick mucus in your nose (nasal saline washes).  · If caused by bacteria, your doctor may wait to see if you will get better without treatment. You may be given antibiotic medicine if you have:  ? A very bad infection.  ? A weak body defense system.  · If caused by growths in the nose, you may need to have surgery.  Follow these instructions at home:  Medicines  · Take, use, or apply over-the-counter and prescription medicines only as told by your doctor. These may include nasal sprays.  · If you were prescribed an antibiotic medicine, take it as told by your doctor. Do not stop taking the antibiotic even if you start to feel better.  Hydrate and humidify    · Drink enough water to keep your pee (urine) pale yellow.  · Use a cool mist humidifier to keep the humidity level in your home above 50%.  · Breathe in steam for 10-15 minutes, 3-4 times a day, or as told by your doctor. You can do this in the bathroom while a hot shower is running.  · Try not to spend time in cool or dry air.  Rest  · Rest as much as you can.  · Sleep with your head raised (elevated).  · Make sure you get enough sleep each night.  General instructions    · Put a warm, moist washcloth on your  face 3-4 times a day, or as often as told by your doctor. This will help with discomfort.  · Wash your hands often with soap and water. If there is no soap and water, use hand .  · Do not smoke. Avoid being around people who are smoking (secondhand smoke).  · Keep all follow-up visits as told by your doctor. This is important.  Contact a doctor if:  · You have a fever.  · Your symptoms get worse.  · Your symptoms do not get better within 10 days.  Get help right away if:  · You have a very bad headache.  · You cannot stop throwing up (vomiting).  · You have very bad pain or swelling around your face or eyes.  · You have trouble seeing.  · You feel confused.  · Your neck is stiff.  · You have trouble breathing.  Summary  · Sinusitis is swelling of your sinuses. Sinuses are hollow spaces in the bones around your face.  · This condition is caused by tissues in your nose that become inflamed or swollen. This traps germs. These can lead to infection.  · If you were prescribed an antibiotic medicine, take it as told by your doctor. Do not stop taking it even if you start to feel better.  · Keep all follow-up visits as told by your doctor. This is important.  This information is not intended to replace advice given to you by your health care provider. Make sure you discuss any questions you have with your health care provider.  Document Revised: 05/20/2019 Document Reviewed: 05/20/2019  [email or web address] Patient Education © 2021 [email or web address]  Allergic Rhinitis, Adult    Allergic rhinitis is an allergic reaction that affects the mucous membrane inside the nose. The mucous membrane is the tissue that produces mucus.  There are two types of allergic rhinitis:  · Seasonal. This type is also called hay fever and happens only during certain seasons.  · Perennial. This type can happen at any time of the year.  Allergic rhinitis cannot be spread from person to person. This condition can be mild, moderate, or severe. It can  develop at any age and may be outgrown.  What are the causes?  This condition is caused by allergens. These are things that can cause an allergic reaction. Allergens may differ for seasonal allergic rhinitis and perennial allergic rhinitis.  · Seasonal allergic rhinitis is triggered by pollen. Pollen can come from grasses, trees, and weeds.  · Perennial allergic rhinitis may be triggered by:  ? Dust mites.  ? Proteins in a pet's urine, saliva, or dander. Dander is dead skin cells from a pet.  ? Smoke, mold, or car fumes.  What increases the risk?  You are more likely to develop this condition if you have a family history of allergies or other conditions related to allergies, including:  · Allergic conjunctivitis.This is inflammation of parts of the eyes and eyelids.  · Asthma. This condition affects the lungs and makes it hard to breathe.  · Atopic dermatitis or eczema. This is long term (chronic ) inflammation of the skin.  · Food allergies  What are the signs or symptoms?  Symptoms of this condition include:  · Sneezing or coughing.  · A stuffy nose (nasal congestion), itchy nose, or nasal discharge.  · Itchy eyes and tearing of the eyes.  · A feeling of mucus dripping down the back of your throat (postnasal drip).  · Trouble sleeping.  · Tiredness or fatigue.  · Headache.  · Sore throat.  How is this diagnosed?  This condition may be diagnosed with your symptoms, medical history, and physical exam. Your health care provider may check for related conditions, such as:  · Asthma.  · Pink eye. This is eye inflammation caused by infection (conjunctivitis).  · Ear infection.  · Upper respiratory infection. This is an an infection in the nose, throat, or upper airways.  You may also have tests to find out which allergens trigger your symptoms. These may include skin tests or blood tests.  How is this treated?  There is no cure for this condition, but treatment can help control symptoms. Treatment may include:  · Taking  medicines that block allergy symptoms, such as corticosteroids and antihistamines. Medicine may be given as a shot, nasal spray, or pill.  · Avoiding any allergens.  · Being exposed again and again to tiny amounts of allergens to help you build a defense against allergens (immunotherapy). This is done if other treatments have not helped. It may include:  ? Allergy shots. These are injected medicines that have small amounts of allergen in them.  ? Sublingual immunotherapy. This involves taking small doses of a medicine with allergen in it under your tongue.  If these treatments do not work, your health care provider may prescribe newer, stronger medicines.  Follow these instructions at home:  Avoiding allergens  Find out what you are allergic to and avoid those allergens. These are some things you can do to help avoid allergens:  · If you have perennial allergies:  ? Replace carpet with wood, tile, or vinyl patricia. Carpet can trap dander and dust.  ? Do not smoke. Do not allow smoking in your home.  ? Change your heating and air conditioning filters at least once a month.  · If you have seasonal allergies, take these steps during allergy season:  ? Keep windows closed as much as possible.  ? Plan outdoor activities when pollen counts are lowest. Check pollen counts before you plan outdoor activities.  ? When coming indoors, change clothing and shower before sitting on furniture or bedding.  · If you have a pet in the house that produces allergens:  ? Keep the pet out of the bedroom.  ? Vacuum, sweep, and dust regularly.  General instructions  · Take over-the-counter and prescription medicines only as told by your health care provider.  · Drink enough fluid to keep your urine pale yellow.  · Keep all follow-up visits as told by your health care provider. This is important.  Where to find more information  · American Academy of Allergy, Asthma & Immunology: www.aaaai.org  Contact a health care provider if:  · You  have a fever.  · You develop a cough that does not go away.  · You make whistling sounds when you breathe (wheeze).  · Your symptoms slow you down or stop you from doing your normal activities each day.  Get help right away if:  · You have shortness of breath.  This symptom may represent a serious problem that is an emergency. Do not wait to see if the symptom will go away. Get medical help right away. Call your local emergency services (911 in the [place] Do not drive yourself to the hospital.  Summary  · Allergic rhinitis may be managed by taking medicines as directed and avoiding allergens.  · If you have seasonal allergies, keep windows closed as much as possible during allergy season.  · Contact your health care provider if you develop a fever or a cough that does not go away.  This information is not intended to replace advice given to you by your health care provider. Make sure you discuss any questions you have with your health care provider.  Document Revised: 01/07/2021 Document Reviewed: 12/15/2020  [employer] Patient Education © 2021 [employer]

## 2021-05-19 NOTE — PROGRESS NOTES
Randall Tang    1980  [id number]    I have reviewed the e-Visit questionnaire and patient's answers, my assessment and plan are as follows:    HPI- Pt reports congested nose, sore throat, cough, sneezing, chest burns with coughing and feels tight x greater than 3 weeks. She does have some mild sinus pressure, but no HA. Cough non productive and is mainly from dry itching throat. She is blowing her nose constantly, it goes from running to clogged. She is a smoker. Denies fever and sick contacts. She is using Flonase, Advil cold and sinus meds. She feels like the medicine is working only temporarily     Review of Systems    Review of Systems - negative unless mentioned in HPI    Diagnoses and all orders for this visit:    1. Acute non-recurrent sinusitis, unspecified location (Primary)  -     amoxicillin-clavulanate (Augmentin) 875-125 MG per tablet; Take 1 tablet by mouth 2 (Two) Times a Day for 7 days.  Dispense: 14 tablet; Refill: 0  -     methylPREDNISolone (MEDROL) 4 MG dose pack; Take as directed on package instructions.  Dispense: 21 tablet; Refill: 0    2. Allergic rhinitis, unspecified seasonality, unspecified trigger  -     methylPREDNISolone (MEDROL) 4 MG dose pack; Take as directed on package instructions.  Dispense: 21 tablet; Refill: 0        Any medications prescribed have been sent electronically to   [place], [place] - [address] - [phone]  - [phone] FX  [address] [place] [address]  Phone: [phone] Fax: [phone]    [place], [place] - [address] [facility] [address] - [phone]  - [phone] FX  [address] [facility] [address]  [facility] [address]  Phone: [phone] Fax: [phone]    I have spent 15 min reviewing this chart.    Lili Thompson, APRN  05/19/21  08:32 EDT

## 2021-10-07 ENCOUNTER — E-VISIT (OUTPATIENT)
Dept: FAMILY MEDICINE CLINIC | Facility: TELEHEALTH | Age: 41
End: 2021-10-07

## 2021-10-07 DIAGNOSIS — R39.89 SUSPECTED UTI: Primary | ICD-10-CM

## 2021-10-07 PROCEDURE — 99422 OL DIG E/M SVC 11-20 MIN: CPT | Performed by: NURSE PRACTITIONER

## 2021-10-07 RX ORDER — NITROFURANTOIN 25; 75 MG/1; MG/1
100 CAPSULE ORAL 2 TIMES DAILY
Qty: 14 CAPSULE | Refills: 0 | Status: SHIPPED | OUTPATIENT
Start: 2021-10-07

## 2021-10-07 RX ORDER — PHENAZOPYRIDINE HYDROCHLORIDE 100 MG/1
100 TABLET, FILM COATED ORAL 3 TIMES DAILY PRN
Qty: 6 TABLET | Refills: 0 | Status: SHIPPED | OUTPATIENT
Start: 2021-10-07

## 2021-10-07 NOTE — PROGRESS NOTES
Randall Tang    1980  [id number]    I have reviewed the e-Visit questionnaire and patient's answers, my assessment and plan are as follows:      HPI  Randall Tang is a 41 y.o. with 5-7 days of urinary frequency and urgency without fever but with associated back pain. She reports no burning with urination at present.    Review of Systems - General ROS: negative for - chills, fatigue or fever  Urinary: Positive for urgency and frequency, negative for dysuria  Abdominal : negative for abdominal pain, n/v/d      Diagnoses and all orders for this visit:    1. Suspected UTI (Primary)  -     nitrofurantoin, macrocrystal-monohydrate, (Macrobid) 100 MG capsule; Take 1 capsule by mouth 2 (Two) Times a Day.  Dispense: 14 capsule; Refill: 0  -     phenazopyridine (Pyridium) 100 MG tablet; Take 1 tablet by mouth 3 (Three) Times a Day As Needed for Bladder Spasms.  Dispense: 6 tablet; Refill: 0    -Macrobid as prescribed - complete entire course of medication even if you begin to feel better.   --Phenazopyridine is for painful urination and bladder spasms--this medication with cause urine to become bright orange and can stain undergarments.    -Continue to increase your fluid intake.   -Abstain from intercourse during antibiotic treatment.   -Practice good perineal hygiene: wipe front to back  -Do not hold your urine- go to the bathroom every 2-3 hours.     -Warning signs: severe abdominal/pelvic/back pain, fever >101, blood in urine - seek medical attention as soon as possible for a hands on/objective exam and possible labs.     -Follow up with your PCP in 2 days if no improvement in symptoms or if symptoms begin to worsen.     Any medications prescribed have been sent electronically to   [facility] - [place], [place] - [address] - [phone]  - [phone]   [address] [place] [address]  Phone: [phone] Fax: [phone]    [place], [place] - [address] -  [phone] [facility] [phone] FX  [address]  [facility] [address]  Phone: [phone] Fax: [phone]      Time Documentation  Counseled patient  Counseling topics: lab results, treatment options and follow up plan  Total encounter time: counseling time more than 50% of visit: 15 minutes        Gayle Quintero, KEYANA  10/07/21  15:53 EDT

## 2021-10-07 NOTE — PATIENT INSTRUCTIONS

## 2021-12-28 ENCOUNTER — LAB (OUTPATIENT)
Dept: LAB | Facility: HOSPITAL | Age: 41
End: 2021-12-28

## 2021-12-28 ENCOUNTER — HOSPITAL ENCOUNTER (OUTPATIENT)
Dept: CARDIOLOGY | Facility: HOSPITAL | Age: 41
Setting detail: HOSPITAL OUTPATIENT SURGERY
Discharge: HOME | End: 2021-12-28

## 2021-12-28 ENCOUNTER — TRANSCRIBE ORDERS (OUTPATIENT)
Dept: ADMINISTRATIVE | Facility: HOSPITAL | Age: 41
End: 2021-12-28

## 2021-12-28 DIAGNOSIS — N39.41 URGE INCONTINENCE: Primary | ICD-10-CM

## 2021-12-28 DIAGNOSIS — N39.41 URGE INCONTINENCE: ICD-10-CM

## 2021-12-28 LAB
ANION GAP SERPL CALCULATED.3IONS-SCNC: 7.6 MMOL/L (ref 5–15)
BASOPHILS # BLD AUTO: 0.07 10*3/MM3 (ref 0–0.2)
BASOPHILS NFR BLD AUTO: 1.2 % (ref 0–1.5)
BUN SERPL-MCNC: 6 MG/DL (ref 6–20)
BUN/CREAT SERPL: 6.7 (ref 7–25)
CALCIUM SPEC-SCNC: 9.6 MG/DL (ref 8.6–10.5)
CHLORIDE SERPL-SCNC: 105 MMOL/L (ref 98–107)
CO2 SERPL-SCNC: 25.4 MMOL/L (ref 22–29)
CREAT SERPL-MCNC: 0.89 MG/DL (ref 0.57–1)
DEPRECATED RDW RBC AUTO: 44.6 FL (ref 37–54)
EOSINOPHIL # BLD AUTO: 0.13 10*3/MM3 (ref 0–0.4)
EOSINOPHIL NFR BLD AUTO: 2.2 % (ref 0.3–6.2)
ERYTHROCYTE [DISTWIDTH] IN BLOOD BY AUTOMATED COUNT: 12.8 % (ref 12.3–15.4)
GFR SERPL CREATININE-BSD FRML MDRD: 70 ML/MIN/1.73
GLUCOSE SERPL-MCNC: 92 MG/DL (ref 65–99)
HCT VFR BLD AUTO: 44.5 % (ref 34–46.6)
HGB BLD-MCNC: 14.7 G/DL (ref 12–15.9)
IMM GRANULOCYTES # BLD AUTO: 0.02 10*3/MM3 (ref 0–0.05)
IMM GRANULOCYTES NFR BLD AUTO: 0.3 % (ref 0–0.5)
LYMPHOCYTES # BLD AUTO: 2.73 10*3/MM3 (ref 0.7–3.1)
LYMPHOCYTES NFR BLD AUTO: 46.6 % (ref 19.6–45.3)
MCH RBC QN AUTO: 31.4 PG (ref 26.6–33)
MCHC RBC AUTO-ENTMCNC: 33 G/DL (ref 31.5–35.7)
MCV RBC AUTO: 95.1 FL (ref 79–97)
MONOCYTES # BLD AUTO: 0.35 10*3/MM3 (ref 0.1–0.9)
MONOCYTES NFR BLD AUTO: 6 % (ref 5–12)
NEUTROPHILS NFR BLD AUTO: 2.56 10*3/MM3 (ref 1.7–7)
NEUTROPHILS NFR BLD AUTO: 43.7 % (ref 42.7–76)
NRBC BLD AUTO-RTO: 0 /100 WBC (ref 0–0.2)
PLATELET # BLD AUTO: 289 10*3/MM3 (ref 140–450)
PMV BLD AUTO: 10.1 FL (ref 6–12)
POTASSIUM SERPL-SCNC: 4.7 MMOL/L (ref 3.5–5.2)
QT INTERVAL: 387 MS
RBC # BLD AUTO: 4.68 10*6/MM3 (ref 3.77–5.28)
SODIUM SERPL-SCNC: 138 MMOL/L (ref 136–145)
WBC NRBC COR # BLD: 5.86 10*3/MM3 (ref 3.4–10.8)

## 2021-12-28 PROCEDURE — 93010 ELECTROCARDIOGRAM REPORT: CPT | Performed by: INTERNAL MEDICINE

## 2021-12-28 PROCEDURE — 93005 ELECTROCARDIOGRAM TRACING: CPT

## 2021-12-28 PROCEDURE — 85025 COMPLETE CBC W/AUTO DIFF WBC: CPT

## 2021-12-28 PROCEDURE — 36415 COLL VENOUS BLD VENIPUNCTURE: CPT

## 2021-12-28 PROCEDURE — 80048 BASIC METABOLIC PNL TOTAL CA: CPT

## 2022-02-11 ENCOUNTER — TRANSCRIBE ORDERS (OUTPATIENT)
Dept: ADMINISTRATIVE | Facility: HOSPITAL | Age: 42
End: 2022-02-11

## 2022-02-11 DIAGNOSIS — Z12.31 ENCOUNTER FOR SCREENING MAMMOGRAM FOR BREAST CANCER: Primary | ICD-10-CM

## 2022-02-22 ENCOUNTER — HOSPITAL ENCOUNTER (OUTPATIENT)
Dept: MAMMOGRAPHY | Facility: HOSPITAL | Age: 42
Discharge: HOME | End: 2022-02-22

## 2022-02-22 ENCOUNTER — HOSPITAL ENCOUNTER (OUTPATIENT)
Dept: ULTRASOUND IMAGING | Facility: HOSPITAL | Age: 42
Discharge: HOME | End: 2022-02-22

## 2022-02-22 DIAGNOSIS — Z12.31 ENCOUNTER FOR SCREENING MAMMOGRAM FOR BREAST CANCER: ICD-10-CM

## 2022-02-22 DIAGNOSIS — N63.0 MASS OF BREAST: ICD-10-CM

## 2022-02-22 PROCEDURE — 76642 ULTRASOUND BREAST LIMITED: CPT

## 2022-02-22 PROCEDURE — 77063 BREAST TOMOSYNTHESIS BI: CPT

## 2022-02-22 PROCEDURE — 77067 SCR MAMMO BI INCL CAD: CPT

## 2022-10-03 ENCOUNTER — E-VISIT (OUTPATIENT)
Dept: FAMILY MEDICINE CLINIC | Facility: TELEHEALTH | Age: 42
End: 2022-10-03

## 2022-10-03 PROCEDURE — BRIGHTMDVISIT: Performed by: NURSE PRACTITIONER

## 2022-10-03 NOTE — E-VISIT TREATED
Chief Complaint: Bladder infection (UTI)   Patient introduction   Patient is 42-year-old female.   Patient has had urinary urgency for 1 to 3 days.   Urine is cloudy with a strong or pungent odor.   Patient-submitted comments Patient writes: There is no extreme burning, more of an urge to go and my urine has a strong odor.   Patient did not request an excuse note.   General presentation   Patient has not had a fever. No nausea or vomiting.   No stomach/pelvic pain.   Mild back pain.   Pain in right flank.   Tried phenazopyridine for their current symptoms. They found it unhelpful.   Previous history of UTI. Current symptoms feel somewhat similar to previous UTIs. Received treatment for UTI 1 to 3 times in last year. Patient's last use of antibiotics for UTI was over 1 month ago.   No history of pyelonephritis. History of kidney stones, but not within the last year.   Uncertain whether treated past UTIs with any of the following: amoxicillin-clavulanate, cefdinir, cefuroxime, cephalexin, ciprofloxacin, fosfomycin, nitrofurantoin monohydrate, TMP/SMX, or trimethoprim.   Previously developed yeast infections as a result of taking antibiotics for past UTIs.   No sexual intercourse in the past week. Does not use diaphragm. No unprotected sexual intercourse with a new partner in the last 2 weeks. Has not been exposed to sexually transmitted infections in the last month.   Patient is not being treated for diabetes mellitus.   Review of red flags/alarm symptoms:    No recent hospitalizations or nursing home care (last 3 months)    No history of renal failure    No recent history of urologic instrumentation    No anatomic abnormalities of the urinary tract    No abnormal vaginal discharge    No visible vaginal sores    No pain with sexual intercourse    No abnormal vaginal bleeding or spotting   Pregnancy/menstrual status/breastfeeding:   Patient is not pregnant. Patient is not breastfeeding. Regarding last menstrual  period, patient writes: No longer have periods since May of 2015.   Current medications   Not taking other medications or supplements.   Medication allergies   None.   Medication contraindication review   Not taking ACE inhibitors and ARBs.   No history of anaphylactic reaction to beta-lactams; folate deficiency; G6PD deficiency; arrhythmia; coronary artery disease; megaloblastic anemia; mononucleosis; myasthenia gravis; cholestatic jaundice; oliguria/anuria; and TMP/SMX-associated thrombocytopenia.   No known history of amoxicillin-clavulanate-associated cholestatic jaundice or nitrofurantoin-associated cholestatic jaundice.   Past medical history   Immune conditions: No immunocompromising conditions. No history of cancer.   Assessment   Uncomplicated acute UTI.   This is the likely diagnosis based on patient's symptoms and history, including:    Previous history of UTI    Current symptoms are somewhat similar to previous UTIs    Mild back pain    Urine described as cloudy with a strong odor    Recent history of delaying urination   Plan   Medications:    fluconazole 150 mg tablet RX 150mg 1 tab PO once 1d as a single dose for vaginal yeast infection. Repeat in 72 hours if symptoms persist. Amount is 2 tab.    nitrofurantoin monohydrate/macrocrystals 100 mg capsule RX 100mg 1 cap PO q12h 5d for infection. This medication is an antibiotic. Take it exactly as directed. You must finish the entire course of medication, even if you feel better after taking the first few doses. Amount is 10 cap.   The patient's prescriptions will be sent to:   [facility]/pharmacy #[id number]   [address] [facility] [place] [address]   Phone: [phone]     Fax: [phone]   Education:    Condition and causes    Prevention    Treatment and self-care    When to call provider   Follow-up:   Patient to follow up as needed for progression or lack of improvement in symptoms within 3d.   ----------   Electronically signed by KEYANA Dennis on 2022-10-03  at 15:07PM   ----------   Patient Interview Transcript:   Knowing about your anatomy is important for diagnosing and treating UTIs. The gender we have on file for you is female, but we realize that this might not tell the whole story. Would you like to tell us more about your anatomy?    No   Not selected:    Yes   Which of these symptoms do you have? Select all that apply.    Sudden urge to urinate and it's hard to hold the urine in   Not selected:    Pain or burning while urinating    Frequent urination   How long have you had these symptoms? Select one.    1 to 3 days   Not selected:    Less than 24 hours    4 to 6 days    7 to 10 days    More than 10 days   Since your current symptoms started, has it been difficult to start, stop, or delay urination? Select one.    No   Not selected:    Yes   What color is your urine? Select one.    Cloudy   Not selected:    Clear    Yellow    Pink or red   Does your urine smell strange (like ammonia) or stronger than usual? Select one.    Yes   Not selected:    No   Do you also have any of these symptoms? Select all that apply.    Back pain   Not selected:    Fever    Nausea    Vomiting    Pain, pressure, or discomfort in the lower abdomen    No   How would you describe your back pain? Select one.    Mild, achy pain   Not selected:    Moderate pain that gets in the way of my daily tasks    Severe, sharp pain that keeps me from my daily tasks   Do you have any flank pain? The flank is the side of the body between the ribs and the hips.    Yes, in my right flank   Not selected:    Yes, in my left flank    Yes, in both my left and right flanks    No   Do you have any of these vaginal symptoms? Select all that apply.    No   Not selected:    Abnormal vaginal itching    Unscheduled or abnormal vaginal bleeding or spotting    Pain during sex    Visible sores on the vagina    Abnormal vaginal discharge   In the past 2 weeks, have you had a medical device or instrument placed in your  urinary tract? Examples include catheters, stents, and nephrostomy tubes. Select one.    No   Not selected:    Yes   Have you recently been hospitalized or been a resident of a nursing home or other long-term care facility? This doesn't include emergency room (ER) visits. Select one.    No   Not selected:    Yes, within the last 2 weeks    Yes, within the last 3 months   Have you ever had severe problems with your kidneys, such as kidney failure? Select one.    No   Not selected:    Yes   Kidney stones    More than a year ago   Not selected:    Within the last year    No   Kidney infection (pyelonephritis)    No   Not selected:    Within the last year    More than a year ago   Have you ever been diagnosed with any of these? Select all that apply.    No   Not selected:    Urinary reflux    Bladder diverticula    Single (or horseshoe) kidney    Duplicated urethra   Have you recently held your urine for a long time after you felt the urge to go? Select one.    Yes   Not selected:    No   Have you recently avoided eating or drinking so you wouldn't have the urge to urinate as often? Select one.    No   Not selected:    Yes   Do you use a diaphragm? Select one.    No   Not selected:    Yes   Are you pregnant? Select one.    No   Not selected:    Yes   When was your last menstrual period? If you don't currently have periods or no longer have periods, please briefly explain.    No longer have periods since May of 2015   Are you breastfeeding? Select one.    No   Not selected:    Yes   Have you had sexual intercourse in the past week? Recent sexual intercourse is a risk factor for urinary tract infections. Select one.    No   Not selected:    Yes   Have you been exposed to a sexually transmitted infection (STI or STD) in the last month? Examples include chlamydia, gonorrhea, trichomoniasis, and herpes. Select one.    No, not that I know of   Not selected:    Yes   Have you had unprotected sexual intercourse with a new  partner in the last 2 weeks? Select one.    No   Not selected:    Yes   Have you traveled to any of these countries within the last 3 months? Recent travel to these countries may affect which medication we recommend for your symptoms. Select all that apply.    None of these   Not selected:    [place]    [place]    [place]    [place]   Have you taken any of these medications for your current symptoms? Select any that may apply.    Phenazopyridine (Azo, Baridium, Pyridium, Uricalm, Uristat)   Not selected:    Acetaminophen (Tylenol)    Ibuprofen (Advil, Motrin)    No   Did phenazopyridine work well for you? Select one.    No   Not selected:    Yes   Have you ever had a urinary tract infection (UTI)? A UTI is often called a bladder infection or acute cystitis. Select one.    Yes   Not selected:    No, not that I know of   How much do your current symptoms feel like past UTIs? Select one.    Somewhat the same   Not selected:    Exactly the same    Mostly the same    Totally different   In the past year, how many times have you taken antibiotics for a UTI? Select one.    1 to 3   Not selected:    0    4 or more   When did you last take antibiotics for a UTI? Select one.    More than 1 month ago   Not selected:    Less than 1 month ago   Which of these antibiotics have you taken for UTIs in the past? Select all that apply.    I'm not sure   Not selected:    Amoxicillin-clavulanate (Augmentin)    Cefdinir    Cefuroxime    Cephalexin (Keflex)    Ciprofloxacin (Cipro)    Fosfomycin (Monurol)    Nitrofurantoin (Macrobid, Macrodantin)    Sulfamethoxazole/trimethoprim, also known as TMP/SMX (Bactrim, Bactrim DS)    Trimethoprim (Primsol)    Other (specify)   Have you ever developed a yeast infection as a result of taking antibiotics? Select one.    Yes   Not selected:    No, not that I know of   UTIs may be more serious when other factors are present. Let's address those now. Are you being treated for type 1 or type 2 diabetes?  Select one.    No   Not selected:    Yes   Do you have any of these conditions that can affect the immune system? Scroll to see all options. Select all that apply.    None of these   Not selected:    History of bone marrow transplant    Chronic kidney disease    Chronic liver disease (including cirrhosis)    HIV/AIDS    Inflammatory bowel disease (Crohn's disease or ulcerative colitis)    Lupus    Moderate to severe plaque psoriasis    Multiple sclerosis    Rheumatoid arthritis    Sickle cell anemia    Alpha or beta thalassemia    History of solid organ transplant (kidney, liver, or heart)    History of spleen removal    An autoimmune disorder not listed here    A condition requiring treatment with long-term use of oral steroids (such as prednisone, prednisolone, or dexamethasone)   Have you ever been diagnosed with cancer? Select one.    No   Not selected:    Yes, I have cancer now    Yes, but I'm in remission   These last few questions will help us create the right treatment plan for you. Are you being treated for any of these conditions? Select all that apply.    No   Not selected:    Abimbola-Danlos syndrome    Folate deficiency    G6PD deficiency    High blood pressure    History of aortic aneurysm or dissection    Marfan syndrome    Megaloblastic anemia    Mono (mononucleosis)    Myasthenia gravis    Oliguria or anuria    Peripheral vascular disease   Have you ever had jaundice as a result of taking amoxicillin-clavulanate (Augmentin) or nitrofurantoin (Macrobid)? Select all that apply.    No   Not selected:    Yes, from amoxicillin-clavulanate (Augmentin)    Yes, from nitrofurantoin (Macrobid, Macrodantin)   Are you taking any of these medications? Select all that apply.    No   Not selected:    An ACE inhibitor such as lisinopril, enalapril, captopril, or benazepril    An angiotensin II receptor blocker (ARB) such as candesartan, irbesartan, losartan, or valsartan   Are you taking any other medications,  vitamins, or supplements? Select one.    No   Not selected:    Yes   Have you ever had an allergic or bad reaction to any medication? Select one.    No   Not selected:    Yes   Do you need a doctor's note? A doctor's note confirms that you received care today and states when you can return to school or work. It does not contain information about your diagnosis or treatment plan. Your provider will make the final decision on whether to give you a doctor's note. Doctor's notes CANNOT be backdated. Select one.    No   Not selected:    Today only (1 day)   Is there anything else you'd like to tell us about your symptoms?    There is no extreme burning, more of an urge to go and my urine has a strong odor   ----------   Medical history   The following information was received from the EMR on October 03, 2022.   Allergies:    No Known Allergies   - Allergy Type:   - Reaction:   - Severity:   - Clinical Status: Active   - Verification Status: Confirmed   Medications:    FLUTICASONE PROPIONATE 50 MCG/ACT NA SUSP   - Route: Nasal   - Start Date: December 21, 2020   - End Date: None   - Status: Active    PHENAZOPYRIDINE  MG PO TABS   - Route: Oral   - Start Date: October 07, 2021   - End Date: None   - Status: Active    NITROFURANTOIN MONOHYD MACRO 100 MG PO CAPS   - Route: Oral   - Start Date: October 07, 2021   - End Date: None   - Status: Active    METHYLPREDNISOLONE 4 MG PO TBPK   - Route:   - Start Date: May 19, 2021   - End Date: None   - Status: Active   Problem list:    Other dysphagia   - Category: Problem List Item   - Health Status:   - Start Date: June 03, 2020   - End Date: None   - Status: Active    Bloating   - Category: Problem List Item   - Health Status:   - Start Date: June 03, 2020   - End Date: None   - Status: Active    Breast mass   - Category: Problem List Item   - Health Status:   - Start Date: June 03, 2020   - End Date: None   - Status: Active    Depression with anxiety   - Category: Problem  List Item   - Health Status:   - Start Date: May 19, 2021   - End Date: None   - Status: Active    Panic anxiety syndrome   - Category: Problem List Item   - Health Status:   - Start Date: May 19, 2021   - End Date: None   - Status: Active    Heavy periods   - Category: Problem List Item   - Health Status:   - Start Date: May 19, 2021   - End Date: None   - Status: Active    Hypothyroidism   - Category: Problem List Item   - Health Status:   - Start Date: May 19, 2021   - End Date: None   - Status: Active    Insomnia secondary to chronic pain   - Category: Problem List Item   - Health Status:   - Start Date: May 19, 2021   - End Date: None   - Status: Active    Irregular menstrual cycle   - Category: Problem List Item   - Health Status:   - Start Date: May 19, 2021   - End Date: None   - Status: Active    Migraine   - Category: Problem List Item   - Health Status:   - Start Date: May 19, 2021   - End Date: None   - Status: Active    Muscle spasm   - Category: Problem List Item   - Health Status:   - Start Date: May 19, 2021   - End Date: None   - Status: Active    Vocal cord paralysis   - Category: Problem List Item   - Health Status:   - Start Date: May 19, 2021   - End Date: None   - Status: Active

## 2022-10-03 NOTE — EXTERNAL PATIENT INSTRUCTIONS
Diagnosis   Urinary tract infection (UTI)   My name is Lili Thompson. I'm a healthcare provider at [facility]. After reviewing your interview, I see you have a urinary tract infection (UTI).   Medications   Your pharmacy   [facility]/pharmacy #[id number] [address] [place] [address] [phone]     Prescription   Fluconazole (150mg): Take 1 tablet by mouth once for 1 day as a single dose. Use this medication only if you develop a yeast infection. If yeast infection symptoms are still present 3 days after taking the first tablet, take the second tablet.   Nitrofurantoin monohydrate/macrocrystalline (100mg): Take 1 capsule by mouth every 12 hours for 5 days for infection. This medication is an antibiotic. Take it exactly as directed. You must finish the entire course of medication, even if you feel better after taking the first few doses.    Because you've developed yeast infections after taking antibiotics in the past, I've prescribed Diflucan (fluconazole). Diflucan is an oral antifungal that treats yeast infections. Use this medication only if you develop a yeast infection.   About your diagnosis   A UTI is an infection of one or more parts of the urinary tract, most commonly the bladder.   Most UTIs are caused by bacteria (usually E. coli) that travel up the urethra and into the bladder. I see that you have some common signs and symptoms of a UTI:    Sudden urge to urinate    Mild back pain    Cloudy urine    Strange or strong smelling urine   Fortunately, most UTIs aren't serious, and they're easily treated with antibiotics. Make sure you take all of the antibiotic pills given to you, even if you start to feel better after the first few doses. Otherwise, the UTI might come back.   What to expect   If you follow this treatment plan, you should start to feel better within 1 to 2 days.   When to seek care   Call us at [phone]   with any sudden or unexpected symptoms.    Symptoms that don't improve or get worse  in the next 48 hours    Fever that goes above 101F or lasts longer than 24 hours    Shaking or chills    Nausea or vomiting    Severe flank pain (pain in your back or side) or pain that gets worse   Other treatment    Rest and drink plenty of water    Urinate frequently and when you first feel the urge    Place a heating pad on your back or stomach to help relieve some of the discomfort   Prevention    Drink a lot of liquids to help flush bacteria from your system. Water is best. Try for six to eight, 8-ounce glasses a day on a regular basis.    Urinate often and when you first feel the urge. Bacteria can grow when urine stays in the bladder too long. Urinate after sex to flush away bacteria.    After using the toilet, always wipe from front to back. This step is most important after a bowel movement. Wiping from front to back prevents bacteria normally found in stool from entering the urinary tract.   Your provider   Your diagnosis was provided by Lili Thompson, a member of your trusted care team at [facility].   If you have any questions, call us at [phone]  .

## 2023-08-17 ENCOUNTER — TRANSCRIBE ORDERS (OUTPATIENT)
Dept: ADMINISTRATIVE | Facility: HOSPITAL | Age: 43
End: 2023-08-17
Payer: COMMERCIAL

## 2023-08-17 DIAGNOSIS — Z12.31 SCREENING MAMMOGRAM FOR BREAST CANCER: Primary | ICD-10-CM

## 2023-08-30 ENCOUNTER — HOSPITAL ENCOUNTER (OUTPATIENT)
Dept: MAMMOGRAPHY | Facility: HOSPITAL | Age: 43
Discharge: HOME | End: 2023-08-30
Admitting: NURSE PRACTITIONER
Payer: COMMERCIAL

## 2023-08-30 DIAGNOSIS — Z12.31 SCREENING MAMMOGRAM FOR BREAST CANCER: ICD-10-CM

## 2023-08-30 PROCEDURE — 77063 BREAST TOMOSYNTHESIS BI: CPT

## 2023-08-30 PROCEDURE — 77067 SCR MAMMO BI INCL CAD: CPT

## 2024-04-01 ENCOUNTER — E-VISIT (OUTPATIENT)
Dept: FAMILY MEDICINE CLINIC | Facility: TELEHEALTH | Age: 44
End: 2024-04-01

## 2024-04-01 PROCEDURE — BRIGHTMDVISIT: Performed by: NURSE PRACTITIONER

## 2024-04-01 NOTE — E-VISIT TREATED
Chief Complaint: Cold, flu, COVID, sinus, hay fever, or seasonal allergies   Patient introduction   Patient is 43-year-old female with cough, congestion, nasal discharge, itchy or watery eyes, itchy nose or sneezing, and myalgia that started 3 to 5 days ago. Regarding date of symptom onset, patient writes: 3/28/2024.   COVID-19 testing history, vaccination status, and exposure:    Patient was tested for COVID-19 within the last 24 hours. Test result was negative.    Has not received an updated [id number] COVID-19 vaccination (Pfizer-BioNTech or Moderna vaccine after September 12, 2023; or Novavax vaccine after October 3, 2023).    No known exposure to a person with a confirmed or suspected case of COVID-19.    No high-risk (household) exposure to COVID-19 within the last 14 days.   Risk factors for severe disease from COVID-19 infection    Mostly sedentary lifestyle.    Smokes tobacco daily.   General presentation   Symptoms came on suddenly.   Fever:    No fever.   Sinus and nasal symptoms:    Nasal discharge.    Itchy nose or sneezing.    Sinus pain or pressure on or around the forehead, eyes, nose, cheeks, and upper teeth or jaw.    Patient first noticed sinus pain 5 to 9 days ago.    Sinus pain is worse with Valsalva.    No nasal drainage.    No postnasal drip.    No history of unhealed nasal septal ulcer/nasal wound.    No history of antibiotic treatment for sinus infection in the last year.    No history of deviated septum or nasal polyps.   Throat symptoms:    No sore throat.   Head and body aches:    Myalgia.    No headache.    No sweats.    No chills.    No fatigue.   Cough:    Cough is not noticeably worse depending on time of day    Cough is not productive of sputum.   Wheezing and shortness of breath:    Has previously used an albuterol inhaler for URI, bronchitis, or pneumonia.    Not using an albuterol inhaler for current symptoms because they do not feel they need it.    No COPD diagnosis.    No  "asthma diagnosis.    No wheezing.    No shortness of breath.    No previous steroid inhaler use for URIs, bronchitis, or pneumonia.   Chest pain:    No chest pain.   Ear symptoms:    Current symptoms include crackling or popping in the ear(s).   Dizziness:    No dizziness.   Allergies:    No history of allergies.   Flu exposure:    No recent known exposure to a person with a confirmed flu diagnosis.    Has not had a flu vaccine this season.   Patient is taking over-the-counter medications for current symptoms, including cetirizine, dextromethorphan, fluticasone, and phenylephrine.   Review of red flags/alarm symptoms:    No changes in alertness or awareness.    No paroxysmal cough followed by whoop on inspiration    No decreased urination.    No blue or gray coloring present in face, lips, or nail beds.    No swelling, pain, redness, or increased warmth in the calf or lower part of ONE leg only.    No proptosis.   Risk factors for antibiotic resistance:    Smokes tobacco daily.   Pregnancy/menstrual status/breastfeeding:    Not pregnant.    Not breastfeeding.    Regarding date of last menstrual period, patient writes: over 8 years ago.   Self-exam:    Height: 5' 3\"    Weight: 140 lbs    Neck lymph nodes feel normal.    No periorbital edema.   Recent antibiotic use:    Has not taken antibiotics for similar symptoms within the past month.   Current medications   Currently taking fluticasone 50 MCG/ACT nasal spray.   Medication allergies   None.   Medication contraindication review   No history of anaphylactic reaction to beta-lactam antibiotics; aspirin triad; blood dyscrasia; bone marrow depression; catecholamine-releasing paraganglioma; coronary artery disease; coagulation disorder; congenital long QT syndrome; depression; electrolyte abnormalities; fungal infection; GI bleeding; GI obstruction; G6PD deficiency; heart arrhythmia; hypertension; mononucleosis; myasthenia; recent myocardial infarction; NSAID-induced " asthma/urticaria; Parkinson's disease; pheochromocytoma; porphyria; Reye syndrome; seizure disorder; ulcerative colitis; and urinary retention.   No history of metoclopramide-associated dystonic reaction and tardive dyskinesia.   No known history of amoxicillin-clavulanate-associated cholestatic jaundice or hepatic impairment.   No known history of azithromycin-associated cholestatic jaundice or hepatic impairment.   Past medical history   Immune conditions:   No immunocompromising conditions.   No history of cancer.   Social history   Smokes tobacco daily.   Patient-submitted comments   Patient was asked if they had anything to add about their symptoms. Patient writes: Symptoms started on 3/28, sinus pressure, sneezing, watery eyes. Nostrils are clogged and alternate and sometimes it is both of them to were If i push my tongue against the roof of my mouth i can feel the pressure and popping in my nose. .   Patient did not request an excuse note.   Assessment   Allergic rhinitis.   This is the likely diagnosis based on patient's interview responses, including:    Symptom profile   Plan   Medications:    methylprednisolone 4 mg tablets in a dose pack RX 4mg as directed PO qid 6d for allergies. Take as directed on medication packaging. Amount is 21 tab.    fluticasone 50 mcg/actuation nasal spray,suspension OTC 50mcg 2 sprays each nostril nasal qd 30d for nasal symptoms due to allergies or sinusitis. Fluticasone needs to be used every day to be effective. It may take up to a week for the full effects of the medication to be seen. Brands to look for include Flonase. Amount is 16 g.    loratadine 10 mg tablet OTC 10mg 1 tab PO qd PRN 30d for allergies or cough. Brands to look for include Claritin. Amount is 30 tab.   The patient's prescription will be sent to:   [id number]   [address] [facility] [place] [address]   Phone: [phone]     Fax: [phone]   Patient informed to purchase OTC  "medications.   Education:    Condition and causes    Prevention    Treatment and self-care    When to call provider   ----------   Electronically signed by KEYANA Vigil on 2024-04-01 at 13:15PM   ----------   Patient Interview Transcript:   Which of these symptoms are bothering you? Select all that apply.    Cough    Stuffed-up nose or sinuses    Runny nose    Itchy or watery eyes    Itchy nose or sneezing    Muscle or body aches   Not selected:    Shortness of breath    Loss of smell or taste    Sore throat    Hoarse voice or loss of voice    Headache    Fever    Sweats    Chills    Fatigue or tiredness    Nausea or vomiting    Diarrhea    I don't have any of these symptoms   When did your current symptoms start? Select one.    3 to 5 days ago   Not selected:    Less than 48 hours ago    6 to 9 days ago    10 to 14 days ago    2 to 3 weeks ago    3 to 4 weeks ago    More than a month ago   Do you know the exact date your symptoms started? If so, enter the date as MM/DD/YY. Select one.    Yes (specify): 3/28/2024   Not selected:    No   Did your symptoms come on suddenly or gradually? Select one.    Suddenly   Not selected:    Gradually    I'm not sure   Since your current symptoms started, have you been tested for COVID-19? This includes home self-tests as well as nose swab or saliva tests done at a doctor's office, lab, or testing site. Select one.    Yes   Not selected:    No   When was your most recent COVID-19 test? Select one.    Within the last 24 hours   Not selected:    Within the last week (specify date as MM/DD/YY)    7 to 14 days ago    15 to 30 days ago    More than 1 month ago   What was the result of your most recent COVID-19 test? Select one.    Negative   Not selected:    Positive   Has anyone in your household tested positive for COVID-19 in the past 14 days? Select one.    No   Not selected:    Yes   In the last 14 days, have you had close contact with someone who has COVID-19? \"Close contact\" " means any of these: - Caring for someone with COVID-19. - Being within 6 feet of someone with COVID-19 for a total of at least 15 minutes over a 24-hour period. For example, three 5-minute exposures for a total of 15 minutes. - Being in direct contact with respiratory droplets from someone with COVID-19 (being coughed on, kissing, sharing utensils). Select one.    No, not that I know of   Not selected:    Yes, a confirmed case    Yes, a suspected case   Have you gotten the [id number] updated COVID-19 vaccine? This means either the updated Pfizer-BioNTech or Moderna vaccine after September 12, 2023; or the updated Novavax vaccine after October 3, 2023. Select one.    No   Not selected:    Yes   Since your symptoms started, have you felt dizzy? Select one.    No   Not selected:    Yes, but I can still do my regular daily activities    Yes, and it makes it hard to stand, walk, or do daily activities   Do you have chest pain? You might also feel it as discomfort, aching, tightness, or squeezing in the chest. Select one.    No   Not selected:    Yes   Do you cough so hard that it's made you gag or vomit? By gag, we mean has your coughing made you choke or dry heave? Select all that apply.    No   Not selected:    Yes, my coughing has made me gag    Yes, my coughing has made me vomit   Does your cough come in spasms of 10 to 20 coughs in a row, followed by a loud whoop when breathing in? Select one.    No   Not selected:    Yes   When is your cough the worst? Select all that apply.    I haven't noticed a difference depending on time of day   Not selected:    In the morning, or when I wake up    During the day    At nighttime, or while I'm sleeping   Are you coughing up mucus or phlegm? Select one.    No, my cough is dry   Not selected:    Yes, a little    Yes, a lot   Do you feel sinus pain or pressure in any of these areas?    In my forehead    Around my eyes    Behind my nose    In my cheeks    In my upper teeth or jaw    "Not selected:    No   When did you first notice your sinus pain or pressure? Select one.    5 to 9 days ago   Not selected:    Less than 5 days ago    10 to 14 days ago    2 to 4 weeks ago    1 month ago or longer   Does coughing, sneezing, or leaning forward make your sinuses feel worse? Select one.    Yes   Not selected:    No   What color is your nasal drainage? Select one.    My nose is stuffed but not draining or running   Not selected:    Clear    White    Yellow or yellowish    Green or greenish   Is there any drainage (mucus) going down the back of your throat? This kind of drainage is also called \"postnasal drip.\" It can cause frequent throat clearing. Select one.    No, not that I know of   Not selected:    Yes   Have you urinated at least 3 times in the last 24 hours? Select one.    Yes   Not selected:    No   Do your face, lips, or nail beds appear blue or gray? Select one.    No   Not selected:    Yes   Do you have any swelling, pain, redness, or increased warmth in the calf or lower part of ONE leg only? Select one.    No   Not selected:    Yes   Changes in alertness or awareness may mean you need emergency care. Since your symptoms started, have you had any of these? Select all that apply.    None of the above   Not selected:    Confusion    Slurred speech    Not knowing where you are or what day it is    Difficulty staying conscious    Fainting or passing out   Do your symptoms include a whistling sound, or wheezing, when you breathe? Select one.    No   Not selected:    Yes   Are your eyelids or the areas around your eyes puffy? Select one.    No   Not selected:    Yes, but I can easily open my eye(s)    Yes, and it's hard to open my eye(s)    Yes, and my eye(s) are completely swollen shut   Since your symptoms started, have you noticed that one or both of your eyes is bulging or poking out? Select one.    No   Not selected:    Yes   Do you have any of these symptoms in your ear(s)? Select all that " apply.    Crackling or popping   Not selected:    Pain    Pressure    Fullness    Plugged or blocked sensation    None of the above   Are your glands/lymph nodes swollen, or does it hurt when you touch them?    No, not that I can tell   Not selected:    Yes   In the past week, has anyone around you (such as at school, work, or home) had a confirmed diagnosis of the flu? A confirmed diagnosis means that a nose swab was done to verify a flu infection. Select all that apply.    No, not that I know of   Not selected:    I live with someone who has the flu    I've been within touching distance of someone who has the flu    I've walked by, or sat about 3 feet away from, someone who has the flu    I've been in the same building as someone who has the flu   Have you ever been diagnosed with asthma? Select one.    No   Not selected:    Yes   Have you ever been prescribed albuterol to use for wheezing, cough, or shortness of breath caused by a cold, bronchitis, or pneumonia? Albuterol (ProAir, Proventil, Ventolin) is prescribed as an inhaler or a solution to be used with a nebulizer machine. Select one.    Yes   Not selected:    No, not that I know of   Have you ever been prescribed a steroid inhaler to use for wheezing, cough, or shortness of breath caused by a cold, bronchitis, or pneumonia? Some examples of steroid inhalers include Pulmicort, Flovent, Qvar, and Alvesco. Select one.    No, not that I know of   Not selected:    Yes   Have you used albuterol for your current symptoms? This includes both albuterol inhalers and albuterol solution in a nebulizer machine. Select one.    No, I don't feel like I need it   Not selected:    Yes    No, I don't have any, or it's    Do you need a refill of albuterol? Select one.    No   Not selected:    Yes   Have you ever been diagnosed with chronic obstructive pulmonary disease (COPD)? Select one.    No, not that I know of   Not selected:    Yes   In the past month, have you  taken antibiotics for similar symptoms? Examples of antibiotics include amoxicillin, amoxicillin-clavulanate (Augmentin), penicillin, cefdinir (Omnicef), doxycycline, and clindamycin (Cleocin). Select one.    No   Not selected:    Yes (specify)   In the last year, how many times were you treated with antibiotics for a sinus infection? Select one.    None   Not selected:    1 to 3 times    4 or more times   Do any of these apply to you? Select all that apply.    None of the above   Not selected:    I've been hospitalized within the last 5 days    I have diabetes    I'm in close contact with a child in    Have you been diagnosed with a deviated septum or nasal polyps? The nose is divided into two nostrils by the septum. A crooked septum is called a deviated septum. Nasal polyps are growths inside the nose or sinuses. Select one.    No, not that I know of   Not selected:    Yes, but I had surgery to treat them    Yes, I have a deviated septum    Yes, I have nasal polyps    Yes, I have a deviated septum and nasal polyps   Do you have a sore inside your nose that won't heal? Select one.    No, not that I know of   Not selected:    Yes   Do you have allergies (pollen, dust mites, mold, animal dander)? Select one.    No, not that I know of   Not selected:    Yes   Have you had a flu shot this season? Select one.    No   Not selected:    Yes, less than 2 weeks ago    Yes, 2 to 4 weeks ago    Yes, 1 to 3 months ago    Yes, 3 to 6 months ago    Yes, more than 6 months ago   Are you pregnant? Select one.    No   Not selected:    Yes   When was your last menstrual period? If you don't currently have periods or no longer have periods, please briefly explain.    over 8 years ago   Within the last 2 weeks, have you: - Given birth - Had a miscarriage - Had a pregnancy loss - Had an  Being postpartum (live birth or loss) within the last 2 weeks increases your risk of flu complications. Select one.    No   Not  selected:    Yes   Are you breastfeeding? Select one.    No   Not selected:    Yes   The flu and COVID-19 can be more serious for people in certain groups. The next few questions help us figure out if you or anyone you live with is at higher risk for complications from these infections. Do any of these statements apply to you? Select all that apply.    None of the above   Not selected:    I'm     I'm     I'm Black    I'm  or    Do you smoke tobacco? Select one.    Yes, every day   Not selected:    Yes, some days    No, I quit    No   Do you have a mostly inactive lifestyle? Answer yes if all of these are true: - You spend at least 6 hours a day sitting or lying down - You get less than 2 and a half hours per week of moderate exercise such as walking fast - You get less than 1 hour and 15 minutes per week of intense exercise such as jogging or running Select one.    Yes   Not selected:    No   Do you have any of these conditions? Select all that apply.    None of the above   Not selected:    Chronic lung disease, such as cystic fibrosis or interstitial fibrosis    Heart disease, such as congenital heart disease, congestive heart failure, or coronary artery disease    Disorder of the brain, spinal cord, or nerves and muscles, such as dementia, cerebral palsy, epilepsy, muscular dystrophy, or developmental delay    Metabolic disorder or mitochondrial disease    Cerebrovascular disease, such as stroke or another condition affecting the blood vessels or blood supply to the brain    Down syndrome    Mood disorder, including depression or schizophrenia spectrum disorders    Substance use disorder, such as alcohol, opioid, or cocaine use disorder    Tuberculosis    Primary immunodeficiency   Do you live in a group care setting? Examples include: - Nursing home - Residential care - Psychiatric treatment facility - [facility] - Homeless shelter -  Foster care setting Select one.    No   Not selected:    Yes   Are you a healthcare worker? Select one.    No   Not selected:    Yes   People with a very high body mass index (BMI) are at higher risk for developing complications from the flu and severe illness from COVID-19. To determine your BMI, we need to know your weight and height. Please enter your weight (in pounds).    Weight   Please enter your height.    Height   Do you have any of these conditions that can affect the immune system? Scroll to see all options. Select all that apply.    None of these   Not selected:    History of bone marrow transplant    Chronic kidney disease    Chronic liver disease (including cirrhosis)    HIV/AIDS    Inflammatory bowel disease (Crohn's disease or ulcerative colitis)    Lupus    Moderate to severe plaque psoriasis    Multiple sclerosis    Rheumatoid arthritis    Sickle cell anemia    Alpha or beta thalassemia    History of kidney, liver, heart, or other solid organ transplant    History of liver, heart, or other solid organ transplant    History of spleen removal    An autoimmune disorder not listed here (specify)    A condition requiring treatment with long-term use of oral steroids (such as prednisone, prednisolone, or dexamethasone) (specify)   Have you ever been diagnosed with cancer? Select one.    No   Not selected:    Yes, I have cancer now    Yes, but I'm in remission   The flu and COVID-19 can be more serious for people in certain groups. Do any of these apply to the people who live with you? Select all that apply.    None of the above   Not selected:    Under age 5    Over age 65            Black     or     Pregnant    Has given birth, had a miscarriage, had a pregnancy loss, or had an  in the last 2 weeks   Does any member of your household have any of these medical conditions? Select all that apply.    None of the above   Not selected:    Asthma    Disorders  of the brain, spinal cord, or nerves and muscles, such as dementia, cerebral palsy, epilepsy, muscular dystrophy, or developmental delay    Chronic lung disease, such as COPD or cystic fibrosis    Heart disease, such as congenital heart disease, congestive heart failure, or coronary artery disease    Cerebrovascular disease, such as stroke or another condition affecting the blood vessels or blood supply to the brain    Blood disorders, such as sickle cell disease    Diabetes    Metabolic disorders such as inherited metabolic disorders or mitochondrial disease    Kidney disorders    Liver disorders    Weakened immune system due to illness or medications such as chemotherapy or steroids    Children under the age of 19 who are on long-term aspirin therapy    Extreme obesity (BMI > 40)   Do you have any of these conditions? Scroll to see all options. Select all that apply.    None of the above   Not selected:    Aspirin triad (also known as Samter's triad or ASA triad)    Asthma or hives from taking aspirin or other NSAIDs, such as ibuprofen or naproxen    Blockage or narrowing of the blood vessels of the heart    Blood clotting disorder    Blood dyscrasia, such anemia, leukemia, lymphoma, or myeloma    Bone marrow depression    Catecholamine-releasing paraganglioma    Congenital long QT syndrome    Depression    Difficulty urinating or completely emptying your bladder    Uncorrected electrolyte abnormalities    Fungal infection    Gastrointestinal (GI) bleeding    Gastrointestinal (GI) obstruction    G6PD deficiency    Recent heart attack    High blood pressure    Irregular heartbeat or heart rhythm    Mononucleosis (mono)    Myasthenia gravis    Parkinson's disease    Pheochromocytoma    Reye syndrome    Seizure disorder    Thyroid disease    Ulcerative colitis   Have you ever had either of these conditions? Select all that apply.    No   Not selected:    Metoclopramide-associated dystonic reaction    Tardive  dyskinesia   Just a few more questions about medications, and then you're finished. Have you used any non-prescription medications or nasal sprays for your current symptoms? Examples include saline sprays, decongestants, NyQuil, and Tylenol. Select one.    Yes   Not selected:    No   Which of these non-prescription medications have you tried? Scroll to see all options. Select all that apply.    Cetirizine (Zyrtec)    Dextromethorphan (Delsym, Robitussin, Vicks DayQuil Cough)    Fluticasone (Flonase)    Phenylephrine (Sudafed PE)   Not selected:    Acetaminophen (Tylenol)    Budesonide (Rhinocort)    Chlorpheniramine (Aller-chlor, Chlor-Trimeton)    Cromolyn (NasalCrom)    Diphenhydramine (Benadryl)    Fexofenadine (Allegra)    Guaifenesin (Mucinex)    Guaifenesin/dextromethorphan (Delsym DM, Mucinex DM, Robitussin DM)    Ibuprofen (Advil, Motrin, Midol)    Ketotifen (Alaway, Zaditor)    Loratadine (Alavert, Claritin)    Naphazoline-pheniramine (Naphcon-A, Opcon-A, Visine-A)    Omeprazole (Prilosec)    Oxymetazoline (Afrin)    Triamcinolone (Nasacort)    None of the above   Have you taken any monoamine oxidase inhibitor (MAOI) medications in the last 14 days? Examples include rasagiline (Azilect), selegiline (Eldepryl, Zelapar), isocarboxazid (Marplan), phenelzine (Nardil), and tranylcypromine (Parnate). Select one.    No, not that I know of   Not selected:    Yes   Do you take Kynmobi or Apokyn (apomorphine)? Select one.    No   Not selected:    Yes   Are you still taking these medications listed in your medical record? If you're not taking any of these, click Next. Select all that apply.    fluticasone 50 MCG/ACT nasal spray   Are you taking any other medications, vitamins, or supplements? Select one.    No   Not selected:    Yes   Have you ever had an allergic or bad reaction to any medication? Select one.    No   Not selected:    Yes   Are you allergic to milk or to the proteins found in milk (for example, whey  or casein)? A milk allergy is different from lactose intolerance. Select one.    No, not that I know of   Not selected:    Yes   Have you ever had jaundice or liver problems as a result of taking amoxicillin-clavulanate (Augmentin)? Jaundice is a condition in which the skin and the whites of the eyes turn yellow. Select all that apply.    No, not that I know of   Not selected:    Yes, jaundice    Yes, liver problems   Have you ever had jaundice or liver problems as a result of taking azithromycin (Zithromax, Zmax)? Jaundice is a condition in which the skin and the whites of the eyes turn yellow. Select all that apply.    No, not that I know of   Not selected:    Yes, jaundice    Yes, liver problems   Do you need a doctor's note? A doctor's note confirms that you received care today and states when you can return to school or work. It does not contain information about your diagnosis or treatment plan. Your provider will make the final decision on whether to give you a doctor's note and for how long. Doctor's notes CANNOT be backdated. We can't provide medical leave paperwork through this type of visit. If more paperwork is needed to request time off, contact your primary care provider. Select one.    No   Not selected:    Today only (1 day)    Today and tomorrow (2 days)    3 days    5 days    7 days   Is there anything you'd like to add about your symptoms? Please limit your comments to the symptoms covered in this interview. If you include comments about other concerns, your provider may recommend that you be seen in person.    __Symptoms started on 3/28, sinus pressure, sneezing, watery eyes. Nostrils are clogged and alternate and sometimes it is both of them to were If i push my tongue against the roof of my mouth i can feel the pressure and popping in my nose. __   ----------   Medical history   Medical history data does not currently exist for this patient.

## 2024-04-01 NOTE — EXTERNAL PATIENT INSTRUCTIONS
Diagnosis   Allergic rhinitis (allergies)   My name is KEYANA Vigil, and I'm a healthcare provider at [facility]. I reviewed your interview and I see that you have allergic rhinitis, also known as seasonal allergies or hay fever. Allergic rhinitis is not an infection. It isn't caused by a virus or bacteria. Although allergy symptoms can be unpleasant, your symptoms aren't part of a more serious condition.   In your interview, you mentioned that you don't think your symptoms are allergy-related. However, based on your responses, I believe this is the right diagnosis for you. If you still aren't feeling better after following this treatment plan, contact us to set up an appointment.   Based on what you told me in your interview, I haven't prescribed any antibiotics. Antibiotics won't help with allergies. Your symptoms suggest you have allergies, not an infection:    An itchy nose or sneezing.    Itchy or watery eyes.    You haven't had symptoms long enough to suggest a bacterial infection.    You don't have a fever. Viral and bacterial infections, but not allergies, can lead to a fever.    You don't have a sore throat. Bacterial infections like strep throat cause a severe sore throat.   Medications   Your pharmacy   [facility] [id number] [address] [place] [address] [phone]     Prescription   Methylprednisolone (4mg): Take this medication as directed on medication packaging, for allergies.   Non-prescription   Fluticasone (50mcg): Spray 2 times in each nostril once daily for nasal symptoms due to allergies or sinusitis. Fluticasone needs to be used every day to be effective. It may take up to a week for the full effects of the medication to be seen. Brands to look for include Flonase.   Loratadine (10mg): Take 1 tablet by mouth daily as needed for allergies or cough. Brands to look for include Claritin.   About your diagnosis   Allergic rhinitis, also called allergies or hay fever, is  very common. Symptoms can seem similar to a cold, but they're caused by an allergen, not a virus. These are the key things to know about allergies:    The best way to treat allergies is to avoid the cause of your symptoms. Medications can also help your symptoms.    Symptoms range in severity. They may be only mildly annoying or they may seriously affect day-to-day life.    Symptoms can be seasonal, triggered by tree pollen, grasses, and weeds. Mold, dust, or pet dander can cause allergy symptoms in any season.   In addition to symptoms affecting the nose and eyes, allergies can cause fatigue and irritability.   What to expect   Despite the unpleasant symptoms, you should feel better soon. Follow the treatment plan provided here.   When to seek care   Call us at [phone]   with any sudden or unexpected symptoms.    Severe allergy symptoms.    Your normal allergy medications stop working or cause uncomfortable side effects.    Your sinus pain continues for 10 days or more, without improvement.    Severe chest pain.   Other treatment   If possible, avoid the allergens that trigger your allergy symptoms. If you normally use medications, inhalers, or an asthma action plan, continue using them as prescribed.   Prevention   Allergy medications work best if you take them before your symptoms develop.   Avoid smoke and air pollution. Smoke can make infections worse. I encourage you to quit smoking, even for a week or two. Quitting will help your symptoms improve faster.    Flu vaccine information   Who should get a flu vaccine?   Everyone 6 months of age and older should get a yearly flu vaccine.   When should I get vaccinated?   You should get a flu vaccine by the end of October. Once you're vaccinated, it takes about two weeks for antibodies to develop and protect you against the flu. That's why it's important to get vaccinated as soon as possible.   After October, is it too late to get vaccinated?   No. You  should still get vaccinated. As long as the flu viruses are still in your community, flu vaccines will remain available, even into January of next year or later.   Why do I need a flu vaccine EVERY year?   Flu viruses are constantly changing, so flu vaccines are usually updated from one season to the next. Your protection from the flu vaccine also lessens over time.   Is the flu vaccine safe?   Yes. Over the last 50 years, hundreds of millions of Americans have safely received the flu vaccines.   What are the side effects of flu vaccines?   You CANNOT get the flu from a flu vaccine. Common side effects of the flu shot include soreness, redness and/or swelling where the shot was given, low grade fever, and aches. Common side effects of the nasal spray flu vaccine for adults include runny nose, headaches, sore throat, and cough. For children, side effects include wheezing, vomiting, muscle aches, and fever.   Does the flu vaccine increase your risk of getting COVID-19?   No. There is no evidence that getting a flu vaccine increases your risk of getting COVID-19.   Is it safe to get the flu vaccine along with a COVID-19 vaccine?   Yes. It's safe to get the flu vaccine with a COVID-19 vaccine or booster.   Contact your healthcare provider TODAY for details on when and where to get your flu vaccine.    Coronavirus (COVID-19) information   Common symptoms of COVID-19 include fever, cough, shortness of breath, fatigue, muscle or body aches, headaches, new loss of sense of taste or smell, sore throat, stuffy or runny nose, nausea or vomiting, and diarrhea. Most people who get COVID-19 have mild symptoms and can rest at home until they get better. Elderly people and those with chronic medical problems may be at risk for more serious complications.   FAQs about the COVID-19 vaccine   Are the vaccines safe?   Yes. Hundreds of millions of people in the [place] have already safely received COVID-19 vaccines under the most intense  safety monitoring in the history of the [place].   Do I need the vaccine if I've already had COVID?   Yes. Vaccination helps protect you even if you've already had COVID.   If you had COVID-19 and had symptoms, wait to get vaccinated until you've recovered and completed your isolation period.   If you tested positive for COVID-19 but did not have symptoms, you can get vaccinated after 5 full days have passed since you had a positive test, as long as you don't develop symptoms.   How many doses of the vaccine do I need?   Visit www.cdc.gov/coronavirus/2019-ncov/vaccines/stay-up-to-date.html   to find out how to stay up to date with your COVID-19 vaccines.   I'm immunocompromised. How many doses of the vaccine do I need?   For information on how immunocompromised people can stay up to date with their COVID-19 vaccines, visit www.cdc.gov/coronavirus/2019-ncov/vaccines/recommendations/immuno.html  .   What are the common side effects of the vaccine?   A sore arm, tiredness, headache, and muscle pain may occur within two days of getting the vaccine and last a day or two. For the Moderna or Pfizer vaccines, side effects are more common after the second dose. People over the age of 55 are less likely to have side effects than younger people.   After I'm up to date on vaccines, can I still get or spread COVID?   Yes, you can still get COVID, but your disease should be milder. And your risk of serious illness, hospitalization, and complications will be much lower, especially if you're up to date. Unfortunately, you can still spread COVID if you've been vaccinated. That's why it's important to follow isolation guidelines if you get sick or test positive.   After I'm up to date on vaccines, can I go back to normal?   You should still wear a mask indoors in public if:    It's required by laws, rules, regulations, or local guidance.    You have a weakened immune system.    Your age puts you at increased risk of severe disease.    You  have a medical condition that puts you at increased risk of severe disease.    Someone in your household has a weakened immune system, is at increased risk for severe disease, or is unvaccinated.    You're in an area of high transmission.   Where can I get a COVID-19 vaccine?   Visit [facility]'s website for more information. To find a COVID-19 vaccination site near you, visit www.vaccines.gov/  , call 1-242.214.9493  , or text your zip code to 077841 ([place]). Message and data rates may apply.   I've had close contact with someone who has COVID. Do I need to quarantine, and if so, for how long?   For the most current answer, including a calculator to determine whether you need to stay home and for how long, visit www.cdc.gov/coronavirus/2019-ncov/your-health/isolation.html  .   I've tested positive for COVID. How long do I need to isolate?   For the latest recommendations, including a calculator to determine how long you need to stay home, visit www.cdc.gov/coronavirus/2019-ncov/your-health/isolation.html  .   What if I develop symptoms that might be from COVID?   For the latest recommendations on what to do if you're sick, including when to seek emergency care, visit www.cdc.gov/coronavirus/2019-ncov/if-you-are-sick/index.html  .   Your provider   Your diagnosis was provided by KEYANA Vigil, a member of your trusted care team at [facility].   If you have any questions, call us at [phone]  .

## 2024-08-22 ENCOUNTER — E-VISIT (OUTPATIENT)
Dept: FAMILY MEDICINE CLINIC | Facility: TELEHEALTH | Age: 44
End: 2024-08-22
Payer: COMMERCIAL

## 2024-08-22 ENCOUNTER — E-VISIT (OUTPATIENT)
Dept: ADMINISTRATIVE | Facility: OTHER | Age: 44
End: 2024-08-22
Payer: COMMERCIAL

## 2024-08-22 RX ORDER — PREDNISONE 20 MG/1
20 TABLET ORAL DAILY
Start: 2024-08-22 | End: 2024-08-27

## 2024-08-22 RX ORDER — TRIAMCINOLONE ACETONIDE 1 MG/G
1 OINTMENT TOPICAL 2 TIMES DAILY
Qty: 30 G
Start: 2024-08-22

## 2024-08-22 NOTE — E-VISIT TREATED
Date: 2024 12:19:39  Clinician: Toya Montoya  Clinician NPI: [id number]  Patient: Randall Tang  Patient : 1980  Patient Address: [address] [place] [id number]  Patient Phone: [phone]  Visit Protocol: Eczema  Patient Summary:  Randall is a 44 year old ( : 1980 ) female who initiated a visit for evaluation of contact dermatitis.     Images of the skin condition were uploaded.  The symptoms started 1-3 days ago. The rash is located on the knees and legs.   The color of the rash is red.   The affected area has blisters. It feels itchy. The symptoms interfere with sleep.   Symptom details     Color in the affected area: The color in the affected area has rapidly increased in size (within a few hours).    Blisters: Randall has several blisters.     Denied symptoms include dry skin, warm to touch, drainage, sores, crusts, burning, scaly skin, flaky skin, pain, numbness, scabs, and tender to touch. Randall does not feel feverish.   Randall has used over-the-counter oral medications (such as Ibuprofen,   Tylenol, or Benadryl) and over-the-counter topical ointments or cream (such as hydrocortisone) to relieve symptoms. The OTC cream or ointment was somewhat helpful. The OTC oral medication was somewhat helpful.   Precipitating events  Just before the   symptoms started, Randall came in contact with plants.   Randall spent time in a wooded area just before the symptoms started.   Randall has not been in close contact with anyone that has similar symptoms.   Pertinent medical history  Randall has experienced   this skin condition before. The current skin condition does not come and go. The last time Randall experienced this skin condition was more than a year ago.   Randall reported skin tone is Type 2: pale or fair natural skin.   Randall has a history of seasonal   allergies or hay fever.   Randall does not have diabetes. Ongoing medical conditions were denied.   Randall smokes or uses smokeless tobacco.   Randall  denies pregnancy and denies breastfeeding.   Additional information as reported by the patient (free text):   Have taken soothing baths, galdino dish soap baths, it is not drying up and itches relentlessly.  I have been using calamine, cortisone 10, oral Benadryl and i woke up with a few more spots.  not able to rest without getting up and trying to apply more   creams etc   Reason for repeat visit for the same protocol within 24 hours:  I was not able to get pictures at that time so it would not let me complete the visit  See the History of referred by protocol and completed visits section for details on   previous visits (visits currently in queue to be diagnosed will not appear in this section).    MEDICATIONS: Aveeno Soothing Bath topical, Benadryl Allergy oral, Calamine Plus (pramoxine-calamine) topical, Cortizone-10 topical, ALLERGIES: NKDA  Clinician Response:  Dear Randall,  Based on the information provided, you have contact dermatitis. Contact dermatitis is a condition involving skin inflammation. This occurs after contact with a substance that irritates the skin or causes an allergic skin   reaction.  Based on the information provided, you have contact dermatitis likely caused by contact with a plant. Plants such as poison ivy, poison oak, and poison sumac are the most common causes of plant-based contact dermatitis. Symptoms usually   develop between 12 and 72 hours after contact and will appear on the areas of the skin that were in direct contact with the oil of the plant. The amount of exposure can affect the timing and severity of symptoms.  The most common symptom is an itchy   rash. The skin may also be dry or cracked. Occasionally, blisters develop and form a crust on the surface of the skin after bursting. In darker skin tones, skin discoloration is associated with eczema where the skin can become darker, gray, or purple.   Scratching secondary to the itch of eczema can lead to more discoloration. In  lighter skin tones, you may see redness and/or red spots.  Medication information  I am prescribing:       Triamcinolone acetonide 0.1% topical cream. Apply a thin layer to the affected area 2 times a day. Wash hands before and after use. There are no refills with this prescription.      Prednisone (Deltasone) 20 mg oral tablet. Take 2 tablets by mouth 1 time a day for 5 days. There are no refills with this prescription.     Self care  Wash the clothes you were wearing and any other items that could have come into contact with the plant's oil. The oil can remain on these items and spread the rash further.  Steps you can take to be as comfortable as possible:     Avoid scratching the rash    Take a lukewarm bath to soothe the skin (adding colloidal oatmeal can help even more)    Apply a moisturizing lotion immediately after bathing and frequently reapply throughout the day    Apply a cool, wet washcloth to your rash for 15 minutes several times a day    Use mild soap and laundry detergent    Choose clothing and bedding made of a breathable material like cotton    Do not use antibiotic creams or ointments unless recommended by a  provider     Also, as your provider, I need you to know that becoming tobacco-free is the most important thing you can do to protect your current and future health.  When to seek care  Please make an appointment to be seen in a clinic or urgent care if any of the   following occur:     You develop new symptoms or your symptoms become worse    Your rash hasn't improved after 14 days    Symptoms are so severe that you are unable to sleep or do regular activities    You have areas of broken skin from scratching    You notice symptoms of a skin infection (e.g. Color of the affected area spreading, pain that is not improving, fever, warmth)      Diagnosis: Contact dermatitis  Diagnosis ICD: L25.9    Follow up instructions:  ATTENTION: If you have been prescribed medications, your prescriptions  will not be sent until you choose your pharmacy. To do so open the link within your notification, or go to [employer] and click eVisit in the menu to open your   treatment plan. From there, you can select your pharmacy at the bottom of your after visit summary. You can also go to [email or web address]   Prescriptions  Prescription: triamcinolone acetonide (Triderm) 0.1 % topical cream, apply a thin layer to the affected area 2 times a day  Sent To: [facility] [id number] - [phone] - [address] [place] [address]  Prescription: prednisone (Deltasone) 20 mg oral tablet, take 2 tablets by mouth 1 time a day for 5 days  Sent To: [facility] [id number] - [phone] - [address] [place] [address]

## 2024-08-22 NOTE — E-VISIT ESCALATED
Status: Referred Out  Date: 2024 09:45:58  Acuity Level: Within 3 days  Referral message:  Your health is our priority. Although a description of your symptoms was provided, it is important for the provider to  see your rash in an uploaded photo. The appearance of the rash will help confirm the cause of your symptoms and the   most effective treatment for you.  If you can take and upload photos at a later time, you are welcome to:   Repeat this visit   If you will not be able to upload photos, please be seen:   At a clinic  Within 3 days   You won't be charged for this visit.   Thank you for trusting us with your care!   Patient: aRndall Tang  Patient : 1980  Patient Address: [address], [place] [address]  Patient Phone: [phone]  Clinician Response: Unavailable  Diagnosis: Unavailable  Diagnosis ICD: Unavailable     Patient Interview Questions and Responses:  Clinical Protocol: Eczema  Please select the reason for your visit today.: Skin irritation (contact dermatitis)  When did your symptoms start?: 1-3 days ago  Some rashes are caused by an allergy or contact with something irritating to the skin. The following questions will help to identify a possible cause of the skin condition.Just before the symptoms started, did you come in contact with any of the   following?  Select all that apply. New food: No  Some rashes are caused by an allergy or contact with something irritating to the skin. The following questions will help to identify a possible cause of the skin condition.Just before the symptoms started, did you come in contact with any of the   following?  Select all that apply. New medications: No  Some rashes are caused by an allergy or contact with something irritating to the skin. The following questions will help to identify a possible cause of the skin condition.Just before the symptoms started, did you come in contact with any of the   following?  Select all that apply. New hair or  skin care products: No  Some rashes are caused by an allergy or contact with something irritating to the skin. The following questions will help to identify a possible cause of the skin condition.Just before the symptoms started, did you come in contact with any of the   following?  Select all that apply. New jewelry: No  Some rashes are caused by an allergy or contact with something irritating to the skin. The following questions will help to identify a possible cause of the skin condition.Just before the symptoms started, did you come in contact with any of the   following?  Select all that apply. Plants: Yes  Some rashes are caused by an allergy or contact with something irritating to the skin. The following questions will help to identify a possible cause of the skin condition.Just before the symptoms started, did you come in contact with any of the   following?  Select all that apply. Chemicals (e.g. lawn, garden, cleaning products): No  Some rashes are caused by an allergy or contact with something irritating to the skin. The following questions will help to identify a possible cause of the skin condition.Just before the symptoms started, did you come in contact with any of the   following?  Select all that apply. Latex products (e.g. gloves, balloons): No  Some rashes are caused by an allergy or contact with something irritating to the skin. The following questions will help to identify a possible cause of the skin condition.Just before the symptoms started, did you come in contact with any of the   following?  Select all that apply. Something else: No  Some rashes are caused by an allergy or contact with something irritating to the skin. The following questions will help to identify a possible cause of the skin condition.Just before the symptoms started, did you come in contact with any of the   following?  Select all that apply. None of the above: No  Have you been in close contact with anyone that has  similar symptoms?: No  Just before the symptoms started, did you do any of the following? Select all that apply. Traveled: No  Just before the symptoms started, did you do any of the following? Select all that apply. Went swimming: No  Just before the symptoms started, did you do any of the following? Select all that apply. Spent time in a wooded area: Yes  Just before the symptoms started, did you do any of the following? Select all that apply. Spent excess time in the sun: No  Just before the symptoms started, did you do any of the following? Select all that apply. None of the above: No  A rash can refer to many different types of skin conditions. It can cause the skin to change color or become bumpy, blistered, or cracked. Please tell us about the rash.Where is your rash located? Select all that apply. All over body: No  A rash can refer to many different types of skin conditions. It can cause the skin to change color or become bumpy, blistered, or cracked. Please tell us about the rash.Where is your rash located? Select all that apply. Head: No  A rash can refer to many different types of skin conditions. It can cause the skin to change color or become bumpy, blistered, or cracked. Please tell us about the rash.Where is your rash located? Select all that apply. Neck: No  A rash can refer to many different types of skin conditions. It can cause the skin to change color or become bumpy, blistered, or cracked. Please tell us about the rash.Where is your rash located? Select all that apply. Chest: No  A rash can refer to many different types of skin conditions. It can cause the skin to change color or become bumpy, blistered, or cracked. Please tell us about the rash.Where is your rash located? Select all that apply. Stomach area: No  A rash can refer to many different types of skin conditions. It can cause the skin to change color or become bumpy, blistered, or cracked. Please tell us about the rash.Where is your  rash located? Select all that apply. On the back: No  A rash can refer to many different types of skin conditions. It can cause the skin to change color or become bumpy, blistered, or cracked. Please tell us about the rash.Where is your rash located? Select all that apply. Arms: No  A rash can refer to many different types of skin conditions. It can cause the skin to change color or become bumpy, blistered, or cracked. Please tell us about the rash.Where is your rash located? Select all that apply. Elbows: No  A rash can refer to many different types of skin conditions. It can cause the skin to change color or become bumpy, blistered, or cracked. Please tell us about the rash.Where is your rash located? Select all that apply. Hands: No  A rash can refer to many different types of skin conditions. It can cause the skin to change color or become bumpy, blistered, or cracked. Please tell us about the rash.Where is your rash located? Select all that apply. Legs: Yes  A rash can refer to many different types of skin conditions. It can cause the skin to change color or become bumpy, blistered, or cracked. Please tell us about the rash.Where is your rash located? Select all that apply. Knees: Yes  A rash can refer to many different types of skin conditions. It can cause the skin to change color or become bumpy, blistered, or cracked. Please tell us about the rash.Where is your rash located? Select all that apply. Feet: No  A rash can refer to many different types of skin conditions. It can cause the skin to change color or become bumpy, blistered, or cracked. Please tell us about the rash.Where is your rash located? Select all that apply. Groin: No  A rash can refer to many different types of skin conditions. It can cause the skin to change color or become bumpy, blistered, or cracked. Please tell us about the rash.Where is your rash located? Select all that apply. Buttocks: No  What is the color of your rash? Select all  that apply. Red: Yes  What is the color of your rash? Select all that apply. Purple: No  What is the color of your rash? Select all that apply. White: No  What is the color of your rash? Select all that apply. Gray: No  What is the color of your rash? Select all that apply. Black: No  What is the color of your rash? Select all that apply. Same color as unaffected area (may only see skin bumps): No  What is the color of your rash? Select all that apply. Darker than unaffected area: No  Color in the affected area that is spreading rapidly, within minutes or hours, could be a sign of a serious infection or allergic reaction.Is the color in the affected area spreading rapidly?: Yes  Does your rash have any of the following? Blisters: Yes  Does your rash have any of the following? Sores: No  Does your rash have any of the following? Drainage: Yes  Does your rash have any of the following? Dry skin: No  How many blisters do you have?: Several blisters  What color is the drainage? Select all that apply. Clear: Yes  What color is the drainage? Select all that apply. White: No  What color is the drainage? Select all that apply. Yellow: No  What color is the drainage? Select all that apply. Green: No  What color is the drainage? Select all that apply. Red: No  Does your rash have any of the following? Crusts (dried drainage): Yes  Does your rash have any of the following? Scabs (healing sores): No  Does your rash have any of the following? Flaky skin: No  Does your rash have any of the following? Scaly skin: No  Now that we have a description of what the rash looks like, we would like to know how you are feeling.How does the rash feel? Itchy: Yes  Now that we have a description of what the rash looks like, we would like to know how you are feeling.How does the rash feel? Warm to touch: No  Now that we have a description of what the rash looks like, we would like to know how you are feeling.How does the rash feel? Tender to  touch: No  How does your rash feel? Burning: No  How does your rash feel? Painful: No  How does your rash feel? Numb: No  Is the rash interfering with your sleep?: Yes  Do you feel feverish?: No  Treatment for eczema may vary based on different skin tones. Please select your skin tone:  : Type 2: pale or fair natural skin pigmentation, neutral or pink undertone  Have you tried anything for symptom relief?: Yes  What have you tried to relieve the symptoms? Over-the-counter topical ointment or cream (such as hydrocortisone): Yes  What have you tried to relieve the symptoms? Over-the-counter oral medications (such as Ibuprofen, Tylenol, or Benadryl): Yes  What have you tried to relieve the symptoms? Prescription topical ointment or cream (such as Triamcinolone): No  What have you tried to relieve the symptoms? Other prescription or over-the-counter medication: No  Was the over-the-counter topical ointment or cream (such as hydrocortisone) helpful?: Somewhat  Was the over-the-counter oral medication (such as Ibuprofen, Tylenol, or Benadryl) helpful?: Somewhat  Is this the first time you had this skin condition?: No  When was the last time you had this skin condition?: More than a year ago  Does your current skin condition come and go?: No  Do you have a history of any of the following conditions? Select all that apply. Eczema: No  Do you have a history of any of the following conditions? Select all that apply. Seasonal allergies or hay fever: No  Do you have a history of any of the following conditions? Select all that apply. Asthma: No  Do you have a history of any of the following conditions? Select all that apply. None of the above: Yes  Does anyone in your immediate family (biological mother, father, sister, brother) have any of the following? Select all that apply. Eczema: No  Does anyone in your immediate family (biological mother, father, sister, brother) have any of the following? Select all that apply. Seasonal  allergies or hay fever: No  Does anyone in your immediate family (biological mother, father, sister, brother) have any of the following? Select all that apply. Asthma: No  Does anyone in your immediate family (biological mother, father, sister, brother) have any of the following? Select all that apply. None of the above: Yes  Do you have diabetes?: No  Do you have any of the following health conditions? Select all that apply. High blood pressure: No  Do you have any of the following health conditions? Select all that apply. High cholesterol: No  Do you have any of the following health conditions? Select all that apply. Heart problems: No  Do you have any of the following health conditions? Select all that apply. Cancer: No  Do you have any of the following health conditions? Select all that apply. Lung or respiratory disease: No  Do you have any of the following health conditions? Select all that apply. Hypothyroidism: No  Do you have any of the following health conditions? Select all that apply. Neurologic disease: No  Do you have any of the following health conditions? Select all that apply. Kidney disease: No  Do you have any of the following health conditions? Select all that apply. Liver disease: No  Do you have any of the following health conditions? Select all that apply. Gastrointestinal disease: No  Do you have any of the following health conditions? Select all that apply. Joint or bone disorder: No  Do you have any of the following health conditions? Select all that apply. Anemia or blood disorders: No  Do you have any of the following health conditions? Select all that apply. Immune system disorders, including immunodeficiency: No  Do you have any of the following health conditions? Select all that apply. Organ transplant: No  Do you have any of the following health conditions? Select all that apply. None of the above: Yes  Are you pregnant?: No  Are you breastfeeding?: No  Do you smoke or use smokeless  "tobacco?: Yes  Current medications: Yes  Medication allergies: No  Before ending the interview, is there any additional information you would like to share about the following?SymptomsRecent changes in healthMedications or any other related health conditionsAny other relevant information about this visitSelect \"yes\" to   enter additional information.: Yes  Please enter the additional information below:: N/A  Almost done! Photos of the affected area are required to diagnose and treat you.Can photos be taken and uploaded at this time?: No  "

## 2025-04-28 ENCOUNTER — E-VISIT (OUTPATIENT)
Dept: ADMINISTRATIVE | Facility: OTHER | Age: 45
End: 2025-04-28
Payer: COMMERCIAL

## 2025-04-28 NOTE — E-VISIT ESCALATED
Status: Referred Out  Date: 2025 17:06:15  Acuity Level: Within 8 hours  Referral message:  We're sorry you are not feeling well. Your safety is important to us. Because the back pain in the area you reported is a common symptom of a kidney infection, it is possible the infection spread to the kidneys. For this reason, we   would like for you to be seen in person to determine the cause of your symptoms and most effective treatment for you.  For the most appropriate care, please be seen:   At a clinic or urgent care  Within 8 hours   You won't be charged for this visit. We   hope you feel better soon!   Patient: Randall Tang  Patient : 1980  Patient Address: [address] [place] [id number]  Patient Phone: [phone]  Clinician Response: Unavailable  Diagnosis: Unavailable  Diagnosis ICD: Unavailable     Patient Interview Questions and Responses:  Clinical Protocol: UTI  Please select the reason for your visit today.: Urinary tract infection (UTI)  When did your symptoms start?: Today  Do you have any of the following symptoms? Pain, burning, or discomfort with urination: Yes  Do you have any of the following symptoms? Urinating more often than usual: Yes  Do you have any of the following symptoms? A sudden urge to urinate: Yes  Do you have any of the following symptoms? Unable to hold urine: No  Do you have any of the following symptoms? Feeling like the bladder is never empty: Yes  Do you have any of the following symptoms? Foul-smelling urine: No  What color is your urine?: Yellow  Do you have any of the following vaginal symptoms? Discharge: No  Do you have any of the following vaginal symptoms? Itching: No  Do you have any of the following symptoms? Nausea: No  Do you have any of the following symptoms? Abdominal pain: No  Do you have any of the following symptoms? Vomiting: No  A bladder infection can spread to the kidneys, which often results in pain or tenderness felt usually on one (and  rarely both) sides of the mid-back. The pain is usually located on either side (between the ribs and hips) rather than the center of the   back. This kind of pain near your kidney can indicate a severe kidney infection.Since the symptoms started, have you had new pain in the flank or back area just beneath the ribs where the kidneys are located?: Yes, on the right flank

## (undated) DEVICE — BW-412T DISP COMBO CLEANING BRUSH: Brand: SINGLE USE COMBINATION CLEANING BRUSH

## (undated) DEVICE — SPNG GZ WOVN 4X4IN 12PLY 10/BX STRL

## (undated) DEVICE — LAB CORP AGAR SLANT UREA PK/10

## (undated) DEVICE — FRCP BX RADJAW4 NDL 2.8 240CM LG OG BX40

## (undated) DEVICE — SUCTION CANISTER, 3000CC,SAFELINER: Brand: DEROYAL

## (undated) DEVICE — VIAL FORMALIN CAP 10P 40ML

## (undated) DEVICE — SYR LL 3CC

## (undated) DEVICE — THE BITE BLOCK MAXI, LATEX FREE STRAP IS USED TO PROTECT THE ENDOSCOPE INSERTION TUBE FROM BEING BITTEN BY THE PATIENT.

## (undated) DEVICE — KT ORCA ORCAPOD DISP STRL

## (undated) DEVICE — Device